# Patient Record
Sex: FEMALE | Race: WHITE | NOT HISPANIC OR LATINO | URBAN - METROPOLITAN AREA
[De-identification: names, ages, dates, MRNs, and addresses within clinical notes are randomized per-mention and may not be internally consistent; named-entity substitution may affect disease eponyms.]

---

## 2017-05-10 ENCOUNTER — OFFICE VISIT - HEALTHEAST (OUTPATIENT)
Dept: FAMILY MEDICINE | Facility: CLINIC | Age: 57
End: 2017-05-10

## 2017-05-10 ENCOUNTER — COMMUNICATION - HEALTHEAST (OUTPATIENT)
Dept: SCHEDULING | Facility: CLINIC | Age: 57
End: 2017-05-10

## 2017-05-10 DIAGNOSIS — J18.9 RLL PNEUMONIA: ICD-10-CM

## 2017-05-10 DIAGNOSIS — R05.9 COUGH: ICD-10-CM

## 2017-11-14 ENCOUNTER — OFFICE VISIT - HEALTHEAST (OUTPATIENT)
Dept: INTERNAL MEDICINE | Facility: CLINIC | Age: 57
End: 2017-11-14

## 2017-11-14 DIAGNOSIS — K64.8 INTERNAL HEMORRHOIDS: ICD-10-CM

## 2017-11-14 DIAGNOSIS — Z00.00 ANNUAL PHYSICAL EXAM: ICD-10-CM

## 2017-11-14 DIAGNOSIS — E55.9 VITAMIN D DEFICIENCY: ICD-10-CM

## 2017-11-14 DIAGNOSIS — Z13.220 SCREENING FOR LIPID DISORDERS: ICD-10-CM

## 2017-11-14 DIAGNOSIS — Z12.31 VISIT FOR SCREENING MAMMOGRAM: ICD-10-CM

## 2017-11-14 DIAGNOSIS — Z01.89 ENCOUNTER FOR ROUTINE LABORATORY TESTING: ICD-10-CM

## 2017-11-14 DIAGNOSIS — K21.9 GERD (GASTROESOPHAGEAL REFLUX DISEASE): ICD-10-CM

## 2017-11-14 ASSESSMENT — MIFFLIN-ST. JEOR: SCORE: 1274.61

## 2017-11-15 ENCOUNTER — AMBULATORY - HEALTHEAST (OUTPATIENT)
Dept: LAB | Facility: CLINIC | Age: 57
End: 2017-11-15

## 2017-11-15 ENCOUNTER — AMBULATORY - HEALTHEAST (OUTPATIENT)
Dept: INTERNAL MEDICINE | Facility: CLINIC | Age: 57
End: 2017-11-15

## 2017-11-15 ENCOUNTER — COMMUNICATION - HEALTHEAST (OUTPATIENT)
Dept: INTERNAL MEDICINE | Facility: CLINIC | Age: 57
End: 2017-11-15

## 2017-11-15 DIAGNOSIS — K21.9 GERD (GASTROESOPHAGEAL REFLUX DISEASE): ICD-10-CM

## 2017-11-15 DIAGNOSIS — Z01.89 ENCOUNTER FOR ROUTINE LABORATORY TESTING: ICD-10-CM

## 2017-11-15 DIAGNOSIS — Z13.220 SCREENING FOR LIPID DISORDERS: ICD-10-CM

## 2017-11-15 DIAGNOSIS — R10.12 LEFT UPPER QUADRANT PAIN: ICD-10-CM

## 2017-11-15 DIAGNOSIS — E55.9 VITAMIN D DEFICIENCY: ICD-10-CM

## 2017-11-15 LAB
CHOLEST SERPL-MCNC: 235 MG/DL
FASTING STATUS PATIENT QL REPORTED: YES
HDLC SERPL-MCNC: 50 MG/DL
LDLC SERPL CALC-MCNC: 163 MG/DL
TRIGL SERPL-MCNC: 111 MG/DL

## 2017-12-08 ENCOUNTER — HOSPITAL ENCOUNTER (OUTPATIENT)
Dept: MAMMOGRAPHY | Facility: HOSPITAL | Age: 57
Discharge: HOME | End: 2017-12-08
Attending: FAMILY MEDICINE

## 2017-12-08 DIAGNOSIS — Z12.31 VISIT FOR SCREENING MAMMOGRAM: ICD-10-CM

## 2017-12-11 ENCOUNTER — OFFICE VISIT - HEALTHEAST (OUTPATIENT)
Dept: FAMILY MEDICINE | Facility: CLINIC | Age: 57
End: 2017-12-11

## 2017-12-11 DIAGNOSIS — R10.32 LEFT LOWER QUADRANT PAIN: ICD-10-CM

## 2018-01-05 ENCOUNTER — RECORDS - HEALTHEAST (OUTPATIENT)
Dept: ADMINISTRATIVE | Facility: OTHER | Age: 58
End: 2018-01-05

## 2018-04-09 ENCOUNTER — RECORDS - HEALTHEAST (OUTPATIENT)
Dept: ADMINISTRATIVE | Facility: OTHER | Age: 58
End: 2018-04-09

## 2018-05-01 ENCOUNTER — RECORDS - HEALTHEAST (OUTPATIENT)
Dept: ADMINISTRATIVE | Facility: OTHER | Age: 58
End: 2018-05-01

## 2018-05-02 ENCOUNTER — RECORDS - HEALTHEAST (OUTPATIENT)
Dept: ADMINISTRATIVE | Facility: OTHER | Age: 58
End: 2018-05-02

## 2018-05-14 ASSESSMENT — MIFFLIN-ST. JEOR: SCORE: 1291.17

## 2018-05-16 ENCOUNTER — SURGERY - HEALTHEAST (OUTPATIENT)
Dept: SURGERY | Facility: CLINIC | Age: 58
End: 2018-05-16

## 2018-05-16 ENCOUNTER — ANESTHESIA - HEALTHEAST (OUTPATIENT)
Dept: SURGERY | Facility: CLINIC | Age: 58
End: 2018-05-16

## 2018-05-30 ENCOUNTER — OFFICE VISIT - HEALTHEAST (OUTPATIENT)
Dept: FAMILY MEDICINE | Facility: CLINIC | Age: 58
End: 2018-05-30

## 2018-05-30 DIAGNOSIS — G89.18 ACUTE POST-OPERATIVE PAIN: ICD-10-CM

## 2018-10-01 ENCOUNTER — OFFICE VISIT - HEALTHEAST (OUTPATIENT)
Dept: FAMILY MEDICINE | Facility: CLINIC | Age: 58
End: 2018-10-01

## 2018-10-01 DIAGNOSIS — Z23 NEEDS FLU SHOT: ICD-10-CM

## 2018-10-01 DIAGNOSIS — M70.62 GREATER TROCHANTERIC BURSITIS OF LEFT HIP: ICD-10-CM

## 2018-10-17 ENCOUNTER — COMMUNICATION - HEALTHEAST (OUTPATIENT)
Dept: FAMILY MEDICINE | Facility: CLINIC | Age: 58
End: 2018-10-17

## 2019-01-17 ENCOUNTER — OFFICE VISIT - HEALTHEAST (OUTPATIENT)
Dept: FAMILY MEDICINE | Facility: CLINIC | Age: 59
End: 2019-01-17

## 2019-01-17 DIAGNOSIS — B02.9 HERPES ZOSTER WITHOUT COMPLICATION: ICD-10-CM

## 2019-01-17 DIAGNOSIS — Z71.6 ENCOUNTER FOR TOBACCO USE CESSATION COUNSELING: ICD-10-CM

## 2019-02-21 ENCOUNTER — COMMUNICATION - HEALTHEAST (OUTPATIENT)
Dept: FAMILY MEDICINE | Facility: CLINIC | Age: 59
End: 2019-02-21

## 2019-02-21 DIAGNOSIS — F17.200 SMOKING: ICD-10-CM

## 2019-02-26 ENCOUNTER — COMMUNICATION - HEALTHEAST (OUTPATIENT)
Dept: FAMILY MEDICINE | Facility: CLINIC | Age: 59
End: 2019-02-26

## 2019-07-12 ENCOUNTER — OFFICE VISIT - HEALTHEAST (OUTPATIENT)
Dept: FAMILY MEDICINE | Facility: CLINIC | Age: 59
End: 2019-07-12

## 2019-07-12 DIAGNOSIS — Z00.00 ROUTINE GENERAL MEDICAL EXAMINATION AT A HEALTH CARE FACILITY: ICD-10-CM

## 2019-07-12 DIAGNOSIS — N63.0 BREAST MASS: ICD-10-CM

## 2019-07-12 DIAGNOSIS — F17.200 SMOKING: ICD-10-CM

## 2019-07-12 LAB
ALBUMIN SERPL-MCNC: 3.9 G/DL (ref 3.5–5)
ALP SERPL-CCNC: 68 U/L (ref 45–120)
ALT SERPL W P-5'-P-CCNC: 16 U/L (ref 0–45)
ANION GAP SERPL CALCULATED.3IONS-SCNC: 8 MMOL/L (ref 5–18)
AST SERPL W P-5'-P-CCNC: 17 U/L (ref 0–40)
BILIRUB SERPL-MCNC: 0.3 MG/DL (ref 0–1)
BUN SERPL-MCNC: 9 MG/DL (ref 8–22)
CALCIUM SERPL-MCNC: 9.9 MG/DL (ref 8.5–10.5)
CHLORIDE BLD-SCNC: 104 MMOL/L (ref 98–107)
CHOLEST SERPL-MCNC: 234 MG/DL
CO2 SERPL-SCNC: 28 MMOL/L (ref 22–31)
CREAT SERPL-MCNC: 0.76 MG/DL (ref 0.6–1.1)
ERYTHROCYTE [DISTWIDTH] IN BLOOD BY AUTOMATED COUNT: 10.9 % (ref 11–14.5)
FASTING STATUS PATIENT QL REPORTED: NO
GFR SERPL CREATININE-BSD FRML MDRD: >60 ML/MIN/1.73M2
GLUCOSE BLD-MCNC: 85 MG/DL (ref 70–125)
HCT VFR BLD AUTO: 46.5 % (ref 35–47)
HDLC SERPL-MCNC: 48 MG/DL
HGB BLD-MCNC: 15.7 G/DL (ref 12–16)
LDLC SERPL CALC-MCNC: 143 MG/DL
MCH RBC QN AUTO: 30.9 PG (ref 27–34)
MCHC RBC AUTO-ENTMCNC: 33.8 G/DL (ref 32–36)
MCV RBC AUTO: 91 FL (ref 80–100)
PLATELET # BLD AUTO: 225 THOU/UL (ref 140–440)
PMV BLD AUTO: 9 FL (ref 7–10)
POTASSIUM BLD-SCNC: 4.5 MMOL/L (ref 3.5–5)
PROT SERPL-MCNC: 6.8 G/DL (ref 6–8)
RBC # BLD AUTO: 5.09 MILL/UL (ref 3.8–5.4)
SODIUM SERPL-SCNC: 140 MMOL/L (ref 136–145)
TRIGL SERPL-MCNC: 213 MG/DL
TSH SERPL DL<=0.005 MIU/L-ACNC: 0.51 UIU/ML (ref 0.3–5)
WBC: 6.1 THOU/UL (ref 4–11)

## 2019-07-12 ASSESSMENT — MIFFLIN-ST. JEOR: SCORE: 1146.59

## 2019-07-17 ENCOUNTER — HOSPITAL ENCOUNTER (OUTPATIENT)
Dept: MAMMOGRAPHY | Facility: CLINIC | Age: 59
Discharge: HOME | End: 2019-07-17
Attending: NURSE PRACTITIONER

## 2019-07-17 DIAGNOSIS — N63.0 BREAST MASS: ICD-10-CM

## 2020-05-26 ENCOUNTER — COMMUNICATION - HEALTHEAST (OUTPATIENT)
Dept: FAMILY MEDICINE | Facility: CLINIC | Age: 60
End: 2020-05-26

## 2020-05-26 DIAGNOSIS — F17.200 SMOKING: ICD-10-CM

## 2020-10-14 ENCOUNTER — OFFICE VISIT - HEALTHEAST (OUTPATIENT)
Dept: FAMILY MEDICINE | Facility: CLINIC | Age: 60
End: 2020-10-14

## 2020-10-14 ENCOUNTER — COMMUNICATION - HEALTHEAST (OUTPATIENT)
Dept: FAMILY MEDICINE | Facility: CLINIC | Age: 60
End: 2020-10-14

## 2020-10-14 DIAGNOSIS — F41.1 GENERALIZED ANXIETY DISORDER: ICD-10-CM

## 2020-10-14 LAB
ANION GAP SERPL CALCULATED.3IONS-SCNC: 9 MMOL/L (ref 5–18)
BUN SERPL-MCNC: 8 MG/DL (ref 8–22)
CALCIUM SERPL-MCNC: 9.7 MG/DL (ref 8.5–10.5)
CHLORIDE BLD-SCNC: 105 MMOL/L (ref 98–107)
CO2 SERPL-SCNC: 26 MMOL/L (ref 22–31)
CREAT SERPL-MCNC: 0.7 MG/DL (ref 0.6–1.1)
GFR SERPL CREATININE-BSD FRML MDRD: >60 ML/MIN/1.73M2
GLUCOSE BLD-MCNC: 95 MG/DL (ref 70–125)
POTASSIUM BLD-SCNC: 4.5 MMOL/L (ref 3.5–5)
SODIUM SERPL-SCNC: 140 MMOL/L (ref 136–145)
TSH SERPL DL<=0.005 MIU/L-ACNC: 0.58 UIU/ML (ref 0.3–5)

## 2020-10-14 ASSESSMENT — MIFFLIN-ST. JEOR: SCORE: 1117.9

## 2020-10-15 ENCOUNTER — COMMUNICATION - HEALTHEAST (OUTPATIENT)
Dept: FAMILY MEDICINE | Facility: CLINIC | Age: 60
End: 2020-10-15

## 2020-10-16 ASSESSMENT — ANXIETY QUESTIONNAIRES
7. FEELING AFRAID AS IF SOMETHING AWFUL MIGHT HAPPEN: MORE THAN HALF THE DAYS
2. NOT BEING ABLE TO STOP OR CONTROL WORRYING: NEARLY EVERY DAY
GAD7 TOTAL SCORE: 15
6. BECOMING EASILY ANNOYED OR IRRITABLE: MORE THAN HALF THE DAYS
3. WORRYING TOO MUCH ABOUT DIFFERENT THINGS: NEARLY EVERY DAY
5. BEING SO RESTLESS THAT IT IS HARD TO SIT STILL: NOT AT ALL
1. FEELING NERVOUS, ANXIOUS, OR ON EDGE: NEARLY EVERY DAY
4. TROUBLE RELAXING: MORE THAN HALF THE DAYS

## 2021-05-25 ENCOUNTER — RECORDS - HEALTHEAST (OUTPATIENT)
Dept: ADMINISTRATIVE | Facility: CLINIC | Age: 61
End: 2021-05-25

## 2021-05-26 ENCOUNTER — RECORDS - HEALTHEAST (OUTPATIENT)
Dept: ADMINISTRATIVE | Facility: CLINIC | Age: 61
End: 2021-05-26

## 2021-05-27 ENCOUNTER — RECORDS - HEALTHEAST (OUTPATIENT)
Dept: ADMINISTRATIVE | Facility: CLINIC | Age: 61
End: 2021-05-27

## 2021-05-28 ENCOUNTER — RECORDS - HEALTHEAST (OUTPATIENT)
Dept: ADMINISTRATIVE | Facility: CLINIC | Age: 61
End: 2021-05-28

## 2021-05-28 ASSESSMENT — ANXIETY QUESTIONNAIRES: GAD7 TOTAL SCORE: 15

## 2021-05-29 ENCOUNTER — RECORDS - HEALTHEAST (OUTPATIENT)
Dept: ADMINISTRATIVE | Facility: CLINIC | Age: 61
End: 2021-05-29

## 2021-05-30 NOTE — PROGRESS NOTES
ASSESSMENT & PLAN:  1. Smoking  Would like to proceed with smoking cessation again as she has recently restarted smoking due to stress.  Discussed options and will prescribe Chantix, let us know if side effects.  - varenicline (CHANTIX STARTING MONTH BOX) 0.5 mg (11)- 1 mg (42) tablet; TAKE 0.5MG TABLET DAILY FOR 3 DAYS, THEN INCREASE TO 0.5MG TABLET TWICE DAILY FOR 3 DAYS, THEN INCREASE TO 1MG TABLET TWICE DAILY  Dispense: 53 tablet; Refill: 0  - varenicline (CHANTIX) 1 mg tablet; Take 1 tablet (1 mg total) by mouth 2 (two) times a day.  Dispense: 60 tablet; Refill: 1    2. Routine general medical examination at a health care facility  Update fasting lab work, follow-up for results.  Otherwise healthy female exam is up-to-date on screenings.  - Lipid Cascade RANDOM  - HM2(CBC w/o Differential)  - Thyroid [place]  - Comprehensive Metabolic Panel    3. Breast mass  Given multiple masses and lymph node swelling proceed with diagnostic mammogram and ultrasound.  Follow-up for results.  - Mammo Diagnostic Bilateral; Future  - US Breast Limited (Focal) Bilateral; Future      Patient Instructions   US scheduling is [phone]    We'll get back with labs when available.          Orders Placed This Encounter   Procedures     Mammo Diagnostic Bilateral     Standing Status:   Future     Standing Expiration Date:   7/12/2020     Order Specific Question:   Patient's previous breast density:     Answer:   Heterogeneously dense [3]     Order Specific Question:   Is the patient pregnant?     Answer:   No     Order Specific Question:   Can the procedure be changed per Radiologist protocol?     Answer:   Yes     US Breast Limited (Focal) Bilateral     Standing Status:   Future     Standing Expiration Date:   7/12/2020     Order Specific Question:   Is the patient pregnant?     Answer:   No     Order Specific Question:   Can the procedure be changed per Radiologist protocol?     Answer:   Yes     Varicella Zoster, Recombinant  Vaccine IM     Lipid Cascade RANDOM     Order Specific Question:   Fasting is required?     Answer:   No     HM2(CBC w/o Differential)     Thyroid [place]     Comprehensive Metabolic Panel     Medications Discontinued During This Encounter   Medication Reason     varenicline (CHANTIX STARTING MONTH BOX) 0.5 mg (11)- 1 mg (42) tablet Reorder           General  Immunizations reviewed and updated .  Alcohol use, safety and moderation discussed.  Recommended adequate calcium intake/osteoporosis prevention.  Discussed colon cancer screening at age 50, 45 if -American.  Diet and exercise reviewed, including goal of aerobic exercise 30-90 minutes most days of the week, moderation of portions sizes, avoiding eating out and fast food and increase in fruits and vegetables.  Discussed safe sex practices.  Discussed & recommended seat belt (& motorcycle helmet) use.  Discussed & recommended smoke detector.  Discussed sun protection.  Discussed weight management.    The following high BMI interventions were performed this visit: encouragement to exercise    CHIEF COMPLAINT:  Chief Complaint   Patient presents with     Annual Exam     pt is not fasting, bilateral lump in armpits         HISTORY OF PRESENT ILLNESS:  Yu is a 58 y.o. female presenting to the clinic today for a physical examination.  Patient is feeling well would like to discuss multiple breast masses she has found over the last 2 months.  There is one mass in the lower right breast and one mass in the left upper breast she would like evaluated today.  Previous mammogram done about a year and half ago was normal.  She is also noticed some lymph node swelling in both armpits.  She is concerned as there is a very strong family history of cancer and like to get these evaluated.  Multiple female relatives with breast cancer.  Patient otherwise doing well would like to discuss smoking cessation and has quit successfully with Chantix before and would like to  proceed with that again.    REVIEW OF SYSTEMS:   All other systems are negative.    PFSH:  Social History:  The patient reports that there is not domestic violence in her life.     Regular Exercise: yes  Sunscreen Use: yes  Healthy Diet: yes  Dental Visits Regularly: yes  Sexually active: no  Colonoscopy: yes  Prevention of Osteoporosis: no   Last DXA: no    Social History     Socioeconomic History     Marital status:      Spouse name: Not on file     Number of children: Not on file     Years of education: Not on file     Highest education level: Not on file   Occupational History     Not on file   Social Needs     Financial resource strain: Not on file     Food insecurity:     Worry: Not on file     Inability: Not on file     Transportation needs:     Medical: Not on file     Non-medical: Not on file   Tobacco Use     Smoking status: Former Smoker     Packs/day: 0.50     Last attempt to quit: 10/16/2010     Years since quittin.7     Smokeless tobacco: Never Used     Tobacco comment: smoked for 20 years   Substance and Sexual Activity     Alcohol use: Yes     Comment: occassinally every 3 months     Drug use: No     Sexual activity: Never   Lifestyle     Physical activity:     Days per week: Not on file     Minutes per session: Not on file     Stress: Not on file   Relationships     Social connections:     Talks on phone: Not on file     Gets together: Not on file     Attends Restoration service: Not on file     Active member of club or organization: Not on file     Attends meetings of clubs or organizations: Not on file     Relationship status: Not on file     Intimate partner violence:     Fear of current or ex partner: Not on file     Emotionally abused: Not on file     Physically abused: Not on file     Forced sexual activity: Not on file   Other Topics Concern     Not on file   Social History Narrative     Not on file       Social History     Tobacco Use   Smoking Status Former Smoker     Packs/day:  0.50     Last attempt to quit: 10/16/2010     Years since quittin.7   Smokeless Tobacco Never Used   Tobacco Comment    smoked for 20 years       Family History:  Family History   Problem Relation Age of Onset     Drug abuse Father      Cancer Father         throat     Hypertension Sister      Depression Sister      Cancer Sister      Endometrial cancer Sister 46     Breast cancer Sister         in her late 40 s     Cancer Brother         throat     Breast cancer Mother 56     Stroke Mother         ischemic     Diabetes Mother      Cancer Mother      No Medical Problems Daughter      No Medical Problems Maternal Grandmother      No Medical Problems Maternal Grandfather      No Medical Problems Paternal Grandmother      No Medical Problems Paternal Grandfather      No Medical Problems Maternal Aunt      No Medical Problems Paternal Aunt      Stroke Sister         hemorrhagic     Breast cancer Sister         in her 30 s     BRCA / Neg Hx      Colon cancer Neg Hx      Ovarian cancer Neg Hx        Past Medical History:  Active Ambulatory Problems     Diagnosis Date Noted     Joint Stiffness Of The Knee      Abdominal pain      Arthralgias In Multiple Sites      Abnormal Weight Gain      Postherpetic Neuralgia      Acrochordon      Plantar Fasciitis      Esophageal reflux      Colonic And Rectal Disorders      Vitamin D Deficiency      Colon polyp 2018     Acute post-operative pain 2018     Non-intractable vomiting with nausea, unspecified vomiting type      Resolved Ambulatory Problems     Diagnosis Date Noted     Lateral epicondylitis      Joint Pain, Localized In The Elbow      Medial Epicondylitis      H. pylori infection 2016     Past Medical History:   Diagnosis Date     Fibrocystic breast      Hemorrhoids        Allergies   Allergen Reactions     Bupropion Hcl      throat swelling       Tramadol        Immunization History   Administered Date(s) Administered     DT (pediatric) 2004  "    Hep A, historic 2008, 2009     Influenza, seasonal,quad inj 36+ mos 2016, 10/01/2018     Influenza, seasonal,quad inj 6-35 mos 01/10/2013, 10/09/2014     Influenza,seasonal quad, PF 10/25/2013     Influenza,seasonal quad, PF, 36+MOS 2017     Td,adult,historic,unspecified 2004     Tdap 2011     ZOSTER, RECOMBINANT, IM 2019, 2019     Immunization status: up to date and documented    Gynecologic History:  No LMP recorded. Patient has had a hysterectomy.  Contraception:barrier methods  Last Pap: Not applicable.  Last mammogram: . Results were: normal    OB History:  OB History        3    Para   3    Term   3            AB        Living           SAB        TAB        Ectopic        Multiple        Live Births                     Surgical History:  Past Surgical History:   Procedure Laterality Date     APPENDECTOMY       BREAST BIOPSY Right     benign     CHOLECYSTECTOMY       HYSTERECTOMY      benign     LAPAROSCOPIC CHOLECYSTECTOMY N/A 2018    Procedure: LAPAROSCOPIC CHOLECYSTECTOMY;  Surgeon: Nicolas Carver MD;  Location: [facility];  Service:      OOPHORECTOMY       TN APPENDECTOMY      Description: Appendectomy;  Recorded: 2008;     TN BIOPSY OF BREAST, INCISIONAL      Description: Biopsy Breast Open;  Recorded: 2008;  Comments: benign--fi brocystic changes     TN  DELIVERY ONLY      Description:  Section;  Recorded: 2008;     TN DILATION/CURETTAGE,DIAGNOSTIC      Description: Dilation And Curettage;  Recorded: 2008;  Comments: for miscarriage     TN VAGINAL HYSTERECTOMY,UTERUS 250 GMS/<      Description: Vaginal Hysterectomy;  Recorded: 2008;  Comments: single oophorectomy       VITALS:  Vitals:    19 1041   BP: 122/77   Patient Site: Left Arm   Patient Position: Sitting   Cuff Size: Adult Regular   Pulse: 79   Resp: 16   Weight: 136 lb 2 oz (61.7 kg)   Height: 5' 3\" " (1.6 m)     Wt Readings from Last 3 Encounters:   07/12/19 136 lb 2 oz (61.7 kg)   01/17/19 141 lb 6.4 oz (64.1 kg)   10/01/18 150 lb 2 oz (68.1 kg)       PHYSICAL EXAM:  General Appearance: Reveals an alert, cooperative 58-year-old female.   Vitals:  Per nursing notes.  Head: Normocephalic, without obvious abnormality, atraumatic   Eyes: PERRL, conjunctiva/corneas clear, EOM's intact   Ears: Normal TM's and external ear canals, both ears   Oropharynx: Nares normal, septum midline, mucosa normal, no drainage, lips, and tongue normal   Neck: Supple, symmetrical, trachea midline, no adenopathy; thyroid: not enlarged, symmetric, no tenderness/mass/nodules   Back: Symmetric, no curvature, ROM normal, no CVA tenderness   Lungs: Clear to auscultation bilaterally, respirations unlabored, no wheezing or rales   Heart: Regular rate and rhythm, S1 and S2 normal, no murmur, rub, or gallop, no carotid bruits  Breasts: Breast mass in the right lower outer quadrant approximately an inch to 2 inches below the area [place], feels firm but mobile.  Overall breast tissue is fairly fibrous.  Lump in the left upper outer quadrant very small approximate less than 1 cm in diameter, firm and mobile.  Bilateral lymph node swelling in the axilla.  Lymph nodes are tender to palpation.  Abdomen: Soft, non-tender, no masses, no organomegaly,  Pelvic: Normal-appearing female genitalia. No adnexal masses   Extremities: Extremities normal, atraumatic, no cyanosis or edema   Skin: Skin color, texture, turgor normal, no rashes or lesions   Lymph nodes: Cervical, supraclavicular, and axillary nodes normal.  Neurologic: Normal   Psych: Normal affect. Does not appear anxious or depressed.     DATA REVIEWED:  Additional History from Old Records or Another Person Summarized (2 total): None.     Decision to Obtain Extra information (1 total): None.     Radiology Tests Summarized and Ordered (XRAY/CT/MRI/DXA) (1 total): None.    Labs Reviewed and Ordered (1  total): None.    Medicine Tests Summarized and Ordered (EKG/ECHO/COLONOSCOPY/EGD) (1 total): None.    Independent Review of EKG or X-Ray (2 each): None.    The visit lasted a total of 40 minutes face to face with the patient. Over 50% of the time was spent conducting the physical and in coordination of care.      MEDICATIONS:  Current Outpatient Medications   Medication Sig Dispense Refill     psyllium (METAMUCIL) 0.52 gram capsule Take 0.52 g by mouth daily.       varenicline (CHANTIX STARTING MONTH BOX) 0.5 mg (11)- 1 mg (42) tablet TAKE 0.5MG TABLET DAILY FOR 3 DAYS, THEN INCREASE TO 0.5MG TABLET TWICE DAILY FOR 3 DAYS, THEN INCREASE TO 1MG TABLET TWICE DAILY 53 tablet 0     varenicline (CHANTIX) 1 mg tablet Take 1 tablet (1 mg total) by mouth 2 (two) times a day. 60 tablet 1     No current facility-administered medications for this visit.        Total Data Points:     Zaida Graf CNP    This note has been dictated using voice recognition software. Any grammatical or context distortions are unintentional and inherent to the software

## 2021-05-31 VITALS — WEIGHT: 161.8 LBS | BODY MASS INDEX: 28.21 KG/M2

## 2021-05-31 VITALS — WEIGHT: 169 LBS | BODY MASS INDEX: 29.47 KG/M2

## 2021-05-31 VITALS — BODY MASS INDEX: 27.76 KG/M2 | WEIGHT: 162.6 LBS | HEIGHT: 64 IN

## 2021-06-01 VITALS — WEIGHT: 160.19 LBS | BODY MASS INDEX: 28.38 KG/M2

## 2021-06-01 VITALS — HEIGHT: 63 IN | BODY MASS INDEX: 29.77 KG/M2 | WEIGHT: 168 LBS

## 2021-06-02 ENCOUNTER — RECORDS - HEALTHEAST (OUTPATIENT)
Dept: ADMINISTRATIVE | Facility: CLINIC | Age: 61
End: 2021-06-02

## 2021-06-02 VITALS — WEIGHT: 150.13 LBS | BODY MASS INDEX: 26.59 KG/M2

## 2021-06-02 VITALS — WEIGHT: 141.4 LBS | BODY MASS INDEX: 25.05 KG/M2

## 2021-06-03 VITALS — WEIGHT: 136.13 LBS | BODY MASS INDEX: 24.12 KG/M2 | HEIGHT: 63 IN

## 2021-06-05 VITALS
HEIGHT: 63 IN | SYSTOLIC BLOOD PRESSURE: 126 MMHG | DIASTOLIC BLOOD PRESSURE: 78 MMHG | WEIGHT: 129.8 LBS | HEART RATE: 76 BPM | BODY MASS INDEX: 23 KG/M2

## 2021-06-08 NOTE — TELEPHONE ENCOUNTER
Former patient of Graf & has not established care with another provider.  Please assign refill request to covering provider per Clinic standard process.  Refill Approved    Rx renewed per Medication Renewal Policy. Medication was last renewed on 7/12/19.    Janice Tejeda, [facility] Triage/Med Refill 5/29/2020     Requested Prescriptions   Pending Prescriptions Disp Refills     CHANTIX 1 mg tablet [Pharmacy Med Name: Chantix 1 MG Oral Tablet] 60 tablet 0     Sig: Take 1 tablet by mouth twice daily       Varenicline Refill Protocol Passed - 5/26/2020  1:20 PM        Passed - Normal Serum Creatinine in past 12 months      Creatinine   Date Value Ref Range Status   07/12/2019 0.76 0.60 - 1.10 mg/dL Final             Passed - PCP or prescribing provider visit in last 12 or next 3 months.     Last office visit with prescriber/PCP: Visit date not found OR same dept: Visit date not found  Last physical: 7/12/2019       Next visit within 3 mo: Visit date not found  Next physical within 3 mo: Visit date not found  Prescriber OR PCP: LESLI Dupree  Last diagnosis associated with med order: 1. Smoking  - CHANTIX 1 mg tablet [Pharmacy Med Name: Chantix 1 MG Oral Tablet]; Take 1 tablet by mouth twice daily  Dispense: 60 tablet; Refill: 0     Requested Prescriptions     Pending Prescriptions Disp Refills     CHANTIX 1 mg tablet [Pharmacy Med Name: Chantix 1 MG Oral Tablet] 60 tablet 0     Sig: Take 1 tablet by mouth twice daily     May refill for 3 months if protocol passes.

## 2021-06-12 NOTE — PROGRESS NOTES
"  Assessment/Plan:      Generalized anxiety disorder  Patient's history is most consistent with MEE, exacerbated by coronavirus and life changes related to this.  It sounds like she has always had an element of anxiety.  She is resistant to trying a medicine for her symptoms, so would like her to start with CBT.  Referral placed today.  Labs drawn as below to attempt to exclude an organic cause of her exacerbated anxiety, these are normal.  - AMB REFERRAL TO MENTAL HEALTH AND ADDICTION  - Adult (18+); Outpatient Treatment; Individual/Couples/Family/Group Therapy/Health Psychology; [facility]; [facility]; We will contact you to schedule the appointment or p...  - Basic Metabolic Panel  - Thyroid Cascade        Subjective:       Yu Rocha is a 60 y.o. female who presents for a discussion of anxiety.  Patient relates that she lives with her , 2 sons and 2 grandsons.  She says that 3 adults work outside the home, and her grandsons are hybrid learning.  She says that she can \"never do enough\" and she feels that she cannot adequately keep the house clean.  She relates that she does laundry every day, making the kids take off clothes once they get home, and sanitizes counter tops and touched surfaces at least twice daily.  Her  is high risk for complications related to COVID with his age and diabetes.  She says that she has always had an element of anxiety, but now is not sleeping well and is significantly bothered by her symptoms.  These include feeling anxious, excessive worry, trouble relaxing, irritability and feeling of doom.  She was summoned for jury duty recently and says that this greatly exacerbated her anxiety as she doesn't drive herself and the prospect of needing to do this overwhelmed her.  She has no other questions or concerns today.    Labs Reviewed:  Lab Results   Component Value Date    WBC 6.1 07/12/2019    HGB 15.7 07/12/2019    HCT 46.5 07/12/2019    PLT " "225 07/12/2019    CHOL 234 (H) 07/12/2019    TRIG 213 (H) 07/12/2019    HDL 48 (L) 07/12/2019    ALT 16 07/12/2019    AST 17 07/12/2019    INR 1.02 05/21/2018       The following portions of the patient's history were reviewed and updated as appropriate: allergies, current medications, past family history, past medical history, past social history, past surgical history and problem list.      Current Outpatient Medications:      CHANTIX 1 mg tablet, Take 1 tablet by mouth twice daily, Disp: 60 tablet, Rfl: 0     psyllium (METAMUCIL) 0.52 gram capsule, Take 0.52 g by mouth daily., Disp: , Rfl:      varenicline (CHANTIX STARTING MONTH BOX) 0.5 mg (11)- 1 mg (42) tablet, TAKE 0.5MG TABLET DAILY FOR 3 DAYS, THEN INCREASE TO 0.5MG TABLET TWICE DAILY FOR 3 DAYS, THEN INCREASE TO 1MG TABLET TWICE DAILY, Disp: 53 tablet, Rfl: 0    Review of Systems   Pertinent items are noted in HPI.      Objective:      /78 (Patient Site: Left Arm, Patient Position: Sitting, Cuff Size: Adult Regular)   Pulse 76   Ht 5' 3\" (1.6 m)   Wt 129 lb 12.8 oz (58.9 kg)   Breastfeeding No   BMI 22.99 kg/m      General appearance: alert, appears stated age and cooperative  Head: Normocephalic, without obvious abnormality, atraumatic  Eyes: conjunctivae clear, sclerae anicteric  Neck: no adenopathy, supple, symmetrical, trachea midline and thyroid not enlarged, symmetric, no tenderness/mass/nodules  Lungs: clear to auscultation bilaterally  Heart: regular rate and rhythm, S1, S2 normal, no murmur, click, rub or gallop  Neurologic: Grossly normal  Psychiatric: speech is fluent and thought process is linear; mood is anxious, affect congruent          "

## 2021-06-12 NOTE — TELEPHONE ENCOUNTER
----- Message from Tamie Mcintosh sent at 10/15/2020  5:04 PM CDT -----    Yu Rocha, declined scheduling services.  Our times did not fit patient schedule. Advised her to check with her insurance for more options.     If they do call to schedule, in the future, we will inform you of the outcome.    Thank you for your referral,  [facility]

## 2021-06-14 NOTE — PROGRESS NOTES
Assessment/Plan:        Diagnoses and all orders for this visit:    Left lower quadrant pain  -     Lipase  -     Ambulatory referral to Gastroenterology   Consider minimizing amount of processed foods like pasta.    Follow up in clinic if no answers from [facility].         Subjective:    Patient ID: Yu Rocha is a 57 y.o. female.    HPI patient comes in with history of abdominal pain for the past 2-3 months.  She was seen for her annual physical in November.  A CT scan was done at that time and found a small hiatal hernia but other everything else was normal.  Patient states that her symptoms have been alternating between loose bloody stool, left mid abdominal pain, epigastric pain, thin strand-like stool, blood in stool, pain with defecation.  Patient has had problems with GERD in the past, took omeprazole for a while stopped after 90 days.  In the last 2 weeks started up again because of her epigastric pain.  States that she has changed her lifestyle including reading to reduce coffee intake.  Patient states that she thinks that she has a healthy diet, eats real [employer] primarily cooks and uses her own luzma in her food.  Has seen gastroenterology several years ago her bowel patterns; but has not seen them since and now she has had developed pain.  In mid November labs drawn were CBC and CMP all were normal.  Electrolytes, liver, kidneys were all normal.    The following portions of the patient's history were reviewed and updated as appropriate: allergies, current medications, past family history, past medical history, past social history, past surgical history and problem list.    Review of Systems   Gastrointestinal: Positive for abdominal pain, blood in stool, constipation, diarrhea and rectal pain.   Genitourinary: Negative.    All other systems reviewed and are negative.            Objective:    Physical Exam        General: Patient appears to be in no acute distress.  Lungs: Unlabored. clear breath  sounds heard throughout lung fields.   Heart: Regular rate and rhythm.  Abdomen: Soft rounded abdomen. Bowel sounds heard in all four quadrants; liver, spleen, and kidney not palpable,  tenderness on palpation of mid left quadrant and epigastric area, no CVA tenderness.

## 2021-06-14 NOTE — PROGRESS NOTES
Assessment/Plan:     1. Annual physical exam    She is a pleasant 57 year-old female here for an annual physical examination, routine lab testing, and health maintenance updates.  She is doing well with the exception of a few of the issues noted below.  She is due for her annual mammogram screening for breast cancer.  Today, she feels good, her vital signs are stable, and her physical exam was unremarkable, except as noted below.  She has no other immediate concerns that she would like addressed at this visit.    2. Encounter for routine laboratory testing  - Comprehensive Metabolic Panel; Future  - HM2(CBC w/o Differential); Future    3. Screening for lipid disorders  - Lipid [place] FASTING; Future    4. Vitamin D deficiency  - Vitamin D, Total (25-Hydroxy); Future    5. Internal hemorrhoids  - hydrocortisone 25 mg suppository; Insert 1 suppository (25 mg total) into the rectum 2 (two) times a day as needed for hemorrhoids.  Dispense: 12 suppository; Refill: 1    6. Visit for screening mammogram  - Mammo Screening Bilateral    7. GERD (gastroesophageal reflux disease)    Hx of H. pylori infection, treated with antibiotics and PPI.  Patient discontinued the PPI per the suggestion of her sister, a pharmacist, noting not to stay on the medication too long.  Subsequent to this, she has developed increasing symptoms of mild epigastric pain/discomfort and esophageal reflux again.  - H. pylori Antigen, Stool; Future    - I have recommended the patient eat a healthy, well-balanced diet and exercise regularly.       Return in about 3 months (around 2/14/2018) for or, Follow-up with your Primary Care Provider.       Subjective:     Yu Rocha is a 57 y.o. female with a PMH pertinent for hemorrhoids, vitamin D deficiency, postherpetic neuralgia, H. pylori infection, GERD, and polyarthralgia who presents for an annual exam. She has been doing well over the last year since her last physical examination.  She notes that she  "has lost approximately 6 lbs since last year.  She complains of some GERD symptoms associated with LUQ and mid-epigastric pain/discomfort that has been going on for approximately 2 months.  She gets what feels like \"stomach acid squirting into her esophagus with bending forward or to the right side at times.  She sleeps on her left side to help prevent reflux at night.  She reports a fairly recent history of H. pylori infection and was treated with antibiotics and PPI.  She states that she recently stopped the PPI about 2 months ago as her sister, who is a pharmacist, told her not to stay on that medication longer than so many months, so she discontinued it.  She also has what sounds like internal hemorrhoids as she has noted BRB with wiping and also a bulging near her anus with increased straining to have a BM.    The patient reports that there is not domestic violence in her life.     Healthy Habits:   Regular Exercise: Yes  Sunscreen Use: No  Healthy Diet: Yes  Dental Visits Regularly: Yes  Sexually active: No  Mammogram: Yes,  Last was 2016  Colonoscopy: Yes, Last was May 2011  Prevention of Osteoporosis: Yes  Last Dexa: N/A    Immunization History   Administered Date(s) Administered     DT (pediatric) 2004     Hep A, historic 2008, 2009     Influenza, seasonal,quad inj 36+ mos 2016     Influenza, seasonal,quad inj 6-35 mos 01/10/2013, 10/09/2014     Influenza,seasonal quad, PF 10/25/2013     Influenza,seasonal quad, PF, 36+MOS 2017     Td,adult,historic,unspecified 2004     Tdap 2011     Immunization status: up to date and documented.    Gynecologic History  No LMP recorded. Patient has had a hysterectomy.  Contraception: none  Last Pap: . Results were: normal   Last mammogram: 2016. Results were: normal    OB History    Para Term  AB Living   3 3 3      SAB TAB Ectopic Multiple Live Births             # Outcome Date GA Lbr Ron/2nd Weight Sex " Delivery Anes PTL Lv   3 Term            2 Term            1 Term                   Current Outpatient Prescriptions   Medication Sig Dispense Refill     CA/D3/MAG OX/ZINC//SUNIL/BOR (CALCIUM 600+D3 PLUS ORAL) Take by mouth daily.       cholecalciferol, vitamin D3, (VITAMIN D3) 1,000 unit capsule Take 1,000 Units by mouth daily.       MAGNESIUM OXIDE/MAG AA CHELATE (MAGNESIUM, OXIDE/AA CHELATE, ORAL) Take by mouth daily.       hydrocortisone 25 mg suppository Insert 1 suppository (25 mg total) into the rectum 2 (two) times a day as needed for hemorrhoids. 12 suppository 1     No current facility-administered medications for this visit.      Patient Active Problem List   Diagnosis     Joint Stiffness Of The Knee     Abdominal Pain     Arthralgias In Multiple Sites     Abnormal Weight Gain     Postherpetic Neuralgia     Acrochordon     Plantar Fasciitis     Esophageal Reflux     Colonic And Rectal Disorders     Vitamin D Deficiency     H. pylori infection      Past Medical History:   Diagnosis Date     Fibrocystic breast      Hemorrhoids      Past Surgical History:   Procedure Laterality Date     BREAST BIOPSY Right 2005    benign     HYSTERECTOMY  1992    benign     OOPHORECTOMY       IN APPENDECTOMY      Description: Appendectomy;  Recorded: 2008;     IN BIOPSY OF BREAST, INCISIONAL      Description: Biopsy Breast Open;  Recorded: 2008;  Comments: benign--fi brocystic changes     IN  DELIVERY ONLY      Description:  Section;  Recorded: 2008;     IN DILATION/CURETTAGE,DIAGNOSTIC      Description: Dilation And Curettage;  Recorded: 2008;  Comments: for miscarriage     IN VAGINAL HYSTERECTOMY,UTERUS 250 GMS/<      Description: Vaginal Hysterectomy;  Recorded: 2008;  Comments: single oophorectomy     Allergies   Allergen Reactions     Bupropion Hcl      throat swelling       Tramadol       Family History   Problem Relation Age of Onset     Drug abuse Father      Cancer  Father      throat     Breast cancer Sister      Hypertension Sister      Depression Sister      Cancer Sister      Endometrial cancer Sister 46     Cancer Brother      throat     Breast cancer Mother      Stroke Mother      ischemic     Diabetes Mother      Cancer Mother      No Medical Problems Daughter      No Medical Problems Maternal Grandmother      No Medical Problems Maternal Grandfather      No Medical Problems Paternal Grandmother      No Medical Problems Paternal Grandfather      No Medical Problems Maternal Aunt      No Medical Problems Paternal Aunt      Stroke Sister      hemorrhagic     BRCA 1/2 Neg Hx      Colon cancer Neg Hx      Ovarian cancer Neg Hx      Social History     Social History     Marital status:      Spouse name: N/A     Number of children: N/A     Years of education: N/A     Occupational History     Not on file.     Social History Main Topics     Smoking status: Former Smoker     Smokeless tobacco: Not on file      Comment: smoked for 20 years     Alcohol use No     Drug use: Not on file     Sexual activity: Not on file     Other Topics Concern     Not on file     Social History Narrative       Review of Systems  General:  Negative except as noted above  Eyes: Negative except as noted above  Ears/Nose/Throat: Negative except as noted above  Cardiovascular: Negative except as noted above  Respiratory:  Negative except as noted above  Gastrointestinal:  Negative except as noted above  Musculoskeletal:  Negative except as noted above  Skin: Negative except as noted above  Neurologic: Negative except as noted above  Psychiatric: Negative except as noted above  Endocrine: Negative except as noted above  Heme/Lymphatic: Negative except as noted above   Allergic/Immunologic: Negative except as noted above      Objective:      Vitals:    11/14/17 1905   BP: 122/80   Patient Site: Right Arm   Patient Position: Sitting   Cuff Size: Adult Regular   Pulse: 60   Weight: 162 lb 9.6 oz (73.8  "kg)   Height: 5' 3.5\" (1.613 m)     Wt Readings from Last 3 Encounters:   11/14/17 162 lb 9.6 oz (73.8 kg)   05/10/17 169 lb (76.7 kg)   10/05/16 168 lb (76.2 kg)     Body mass index is 28.35 kg/(m^2). (>25?)    Physical Exam:  General Appearance: Alert, cooperative, no distress.  Head: Normocephalic, without obvious abnormality, atraumatic  Eyes: PERRL, conjunctiva/corneas clear, EOM's intact  Ears: Normal TM's and external ear canals, both ears  Nose: Nares normal, septum midline,mucosa normal, no drainage  Throat: Lips, mucosa, and tongue normal, no erythema, exudate, or thrush   Neck: Supple, symmetrical, trachea midline, no adenopathy; thyroid: not enlarged, symmetric, no tenderness/mass/nodules  Back: Symmetric, no curvature, ROM normal, no CVA tenderness  Lungs: Clear to auscultation bilaterally, respirations unlabored  Breasts: Deferred; performs SBEs  Heart: Regular rate and rhythm, S1 and S2 normal, no murmur, rub, or gallop  Abdomen: Soft, mildly TTP mid-epigastric and LUQ, bowel sounds active all four quadrants,  no masses, no organomegaly  Pelvic: Deferred  Extremities: Extremities normal, atraumatic, no cyanosis or edema  Skin: Skin color, texture, turgor normal, no rashes or lesions  Lymph nodes: Cervical, supraclavicular, and axillary nodes normal  Neurologic: Normal  Psych: Normal affect.  Does not appear anxious or depressed.     Diagnostics:   Results for orders placed or performed in visit on 09/23/16   Lipid Cascade   Result Value Ref Range    Cholesterol 246 (H) <=199 mg/dL    Triglycerides 122 <=149 mg/dL    HDL Cholesterol 60 >=50 mg/dL    LDL Calculated 162 (H) <=129 mg/dL    Patient Fasting > 8hrs? Yes    Comprehensive Metabolic Panel   Result Value Ref Range    Sodium 141 136 - 145 mmol/L    Potassium 4.4 3.5 - 5.0 mmol/L    Chloride 107 98 - 107 mmol/L    CO2 23 22 - 31 mmol/L    Anion Gap, Calculation 11 5 - 18 mmol/L    Glucose 85 70 - 125 mg/dL    BUN 8 8 - 22 mg/dL    Creatinine 0.67 " 0.60 - 1.10 mg/dL    GFR MDRD Af Amer >60 >60 mL/min/1.73m2    GFR MDRD Non Af Amer >60 >60 mL/min/1.73m2    Bilirubin, Total 0.4 0.0 - 1.0 mg/dL    Calcium 9.7 8.5 - 10.5 mg/dL    Protein, Total 6.8 6.0 - 8.0 g/dL    Albumin 4.1 3.5 - 5.0 g/dL    Alkaline Phosphatase 71 45 - 120 U/L    AST 23 0 - 40 U/L    ALT 22 0 - 45 U/L   HM2(CBC w/o Differential)   Result Value Ref Range    WBC 6.0 4.0 - 11.0 thou/uL    RBC 4.80 3.80 - 5.40 mill/uL    Hemoglobin 14.4 12.0 - 16.0 g/dL    Hematocrit 42.2 35.0 - 47.0 %    MCV 88 80 - 100 fL    MCH 30.0 27.0 - 34.0 pg    MCHC 34.2 32.0 - 36.0 g/dL    RDW 11.9 11.0 - 14.5 %    Platelets 232 140 - 440 thou/uL    MPV 8.5 7.0 - 10.0 fL   Vitamin D, Total (25-Hydroxy)   Result Value Ref Range    Vitamin D, Total (25-Hydroxy) 28.3 (L) 30.0 - 80.0 ng/mL        Edin Valdez, KENNEDY  Internal Medicine  [facility]

## 2021-06-16 PROBLEM — K63.5 COLON POLYP: Status: ACTIVE | Noted: 2018-05-07

## 2021-06-16 PROBLEM — F41.1 GENERALIZED ANXIETY DISORDER: Status: ACTIVE | Noted: 2020-10-15

## 2021-06-16 PROBLEM — R12 HEARTBURN: Status: ACTIVE | Noted: 2020-10-14

## 2021-06-16 PROBLEM — D12.3 BENIGN NEOPLASM OF TRANSVERSE COLON: Status: ACTIVE | Noted: 2018-05-01

## 2021-06-16 PROBLEM — R19.8 IRREGULAR BOWEL HABITS: Status: ACTIVE | Noted: 2018-01-05

## 2021-06-16 PROBLEM — K64.4 RESIDUAL HEMORRHOIDAL SKIN TAGS: Status: ACTIVE | Noted: 2018-05-01

## 2021-06-18 NOTE — PROGRESS NOTES
Assessment & Plan:  1. Acute post-operative pain  Patient with post op rib cage pain status post cholecystectomy.  She had a complete workup at the ER including chest CT and labs which did not show infection or PE.  Pain appears to be pleuritic in nature. Recommend trial of NSAID.  [place] in am with food.  If worsening pain/SOB - needs to be eval in ER again.  Pt has f/u with surgeon next week.        No orders of the defined types were placed in this encounter.    There are no discontinued medications.    No Follow-up on file.        Chief Complaint:   Chief Complaint   Patient presents with     Follow-up     Complications from gallbladder surgery- [facility] 05/21/18-05/29/18     Concerns     hard lump in abdominal is tender. pain when coughing or sneezing       History of Present Illness:  Yu is a 57 y.o. female presenting to the clinic today for a follow up from a hospital visit on 5/16/18 and 5/21/18 through 5/29/18 for a gallbladder removal and abdominal pain. Surgery went well with no complications. After surgery she experienced abdominal pain around the surgical site exacerbated from coughing or sneezing. She has not followed up with surgeon. She notes the pain was different than the gallbladder pain. She had no infection, but was unable to take a deep breath or would initiate a stabbing feeling in her side. She notes sitting up straight and leaning back takes pressure off of her abdomen. She has been taking ibuprofen, as oxycodone 5 mg made her constipated. She will be scheduling a follow up visit with her surgeon.     Reviewed ER visit and patient had a chest CT to rule out PE.  Her labs were also unremarkable    Review of Systems:  All other systems are negative.     PFSH:    Tobacco Use:  History   Smoking Status     Former Smoker     Packs/day: 0.50     Quit date: 10/16/2010   Smokeless Tobacco     Never Used     Comment: smoked for 20 years       Vitals:  Vitals:    05/30/18 1113   BP: 122/80   Patient  Site: Left Arm   Patient Position: Sitting   Cuff Size: Adult Regular   Pulse: 64   Resp: 16   Temp: 97.5  F (36.4  C)   TempSrc: Oral   Weight: 160 lb 3 oz (72.7 kg)     Wt Readings from Last 3 Encounters:   05/30/18 160 lb 3 oz (72.7 kg)   05/23/18 169 lb (76.7 kg)   05/14/18 168 lb (76.2 kg)     Body mass index is 28.38 kg/(m^2).      Physical Exam:  Constitutional:  Reveals an alert, cooperative, pleasant female in no acute distress.  Vitals:  Per nursing notes.  Cardiac:  Regular rate and rhythm without murmurs, rubs, or gallops.  Lungs: Clear.  Respiratory effort normal.  Abdomen: Palpation of lower ribcage 3/10 pain scale.   Neurologic:  No gross focal deficits.    Psychiatric:  Mood appropriate, memory intact.     Data Reviewed:  Additional history summarized (from old records or history from someone other than the patient or another healthcare provider) (2 TOTAL): Reviewed note from 5/21/18 regarding hospital admittance/discharge summary.     Decision to obtain extra information (old records requested or history from another person or accessing Care Everywhere) (1 TOTAL): None.     Radiology tests summarized or ordered (XRAY/CT/MRI/DXA) (1 TOTAL): 1 reviewed chest CT.    Labs reviewed or ordered (1 TOTAL): Reviewed and ordered labs.     Medicine tests summarized or ordered (EKG/ECHO) (1 TOTAL): None.    Independent review of EKG or X-Ray (2 EACH): None.       The visit lasted a total of 29 minutes face to face with the patient. Over 50% of the time was spent counseling and educating the patient about coordination of care.    I, Elvia Flores, am scribing for and in the presence of, Dr. Bravo.    Nicole ZHOU MD, personally performed the services described in this documentation, as scribed by Elvia Flores in my presence, and it is both accurate and complete.    Medications:  Current Outpatient Prescriptions   Medication Sig Dispense Refill     acetaminophen (TYLENOL) 500 MG tablet Take 2 tablets (1,000  mg total) by mouth 3 (three) times a day.  0     psyllium (METAMUCIL) 0.52 gram capsule Take 0.52 g by mouth daily.       senna-docusate (PERICOLACE) 8.6-50 mg tablet Take 1 tablet by mouth 2 (two) times a day. Adjust dose as needed if stools seem excessive. 60 tablet 0     No current facility-administered medications for this visit.        Total Data Points: 4

## 2021-06-18 NOTE — ANESTHESIA PREPROCEDURE EVALUATION
Anesthesia Evaluation      Patient summary reviewed   No history of anesthetic complications     Airway   Mallampati: II  Neck ROM: full   Pulmonary - negative ROS and normal exam                          Cardiovascular - negative ROS and normal exam   Neuro/Psych - negative ROS     Endo/Other - negative ROS      GI/Hepatic/Renal - negative ROS           Dental    (+) poor dentition                       Anesthesia Plan  Planned anesthetic: general endotracheal    ASA 2   Induction: intravenous   Anesthetic plan and risks discussed with: patient

## 2021-06-18 NOTE — ANESTHESIA CARE TRANSFER NOTE
Last vitals:   Vitals:    05/16/18 1223   BP: 110/60   Pulse: 81   Resp: 10   Temp: 36.8  C (98.2  F)   SpO2: 100%     Patient's level of consciousness is drowsy  Spontaneous respirations: yes  Maintains airway independently: yes  Dentition unchanged: yes  Oropharynx: oropharynx clear of all foreign objects    QCDR Measures:  ASA# 20 - Surgical Safety Checklist: WHO surgical safety checklist completed prior to induction  PQRS# 430 - Adult PONV Prevention: 4558F - Pt received => 2 anti-emetic agents (different classes) preop & intraop  ASA# 8 - Peds PONV Prevention: NA - Not pediatric patient, not GA or 2 or more risk factors NOT present  PQRS# 424 - Princess-op Temp Management: 4559F - At least one body temp DOCUMENTED => 35.5C or 95.9F within required timeframe  PQRS# 426 - PACU Transfer Protocol: - Transfer of care checklist used  ASA# 14 - Acute Post-op Pain: ASA14B - Patient did NOT experience pain >= 7 out of 10

## 2021-06-18 NOTE — ANESTHESIA POSTPROCEDURE EVALUATION
Patient: Yu AMATO Aj  LAPAROSCOPIC CHOLECYSTECTOMY  Anesthesia type: general    Patient location: PACU  Last vitals:   Vitals:    05/16/18 1400   BP: 119/61   Pulse: 88   Resp: 18   Temp: 36.7  C (98  F)   SpO2: 99%     Post vital signs: stable  Level of consciousness: awake and responds to simple questions  Post-anesthesia pain: pain controlled  Post-anesthesia nausea and vomiting: no  Pulmonary: unassisted, return to baseline  Cardiovascular: stable and blood pressure at baseline  Hydration: adequate  Anesthetic events: no    QCDR Measures:  ASA# 11 - Princess-op Cardiac Arrest: ASA11B - Patient did NOT experience unanticipated cardiac arrest  ASA# 12 - Princess-op Mortality Rate: ASA12B - Patient did NOT die  ASA# 13 - PACU Re-Intubation Rate: ASA13B - Patient did NOT require a new airway mgmt  ASA# 10 - Composite Anes Safety: ASA10A - No serious adverse event    Additional Notes:

## 2021-06-20 NOTE — PROGRESS NOTES
ASSESSMENT/PLAN  1. Greater trochanteric bursitis of left hip  Patient provided with patient education.  Will start Aleve 1-2 tabs twice daily for pain control.  Patient to restart PPI if any stomach issues/contact us.  We will refer to physical therapy for treatment; consider follow-up with Dr. Bravo for injection of the bursa if persisting symptoms.  Defer imaging at this point.  - Ambulatory referral to Physical Therapy    2. Needs flu shot  Seasonal flu shot to be administered by nursing today          Pt states an understanding and agrees to the above plan.  Return in about 2 months (around 12/1/2018) for Annual physical.          SUBJECTIVE:   Chief Complaint   Patient presents with     Hip Pain     left hip pain when walking and sitting x 3 weeks       Uy LIMA Aj 58 y.o. female    Current Outpatient Prescriptions   Medication Sig Dispense Refill     CALCIUM ORAL Take by mouth.       cholecalciferol, vitamin D3, 1,000 unit tablet Take 1,000 Units by mouth 2 (two) times a day.       psyllium (METAMUCIL) 0.52 gram capsule Take 0.52 g by mouth daily.       No current facility-administered medications for this visit.      Allergies: Bupropion hcl and Tramadol   No LMP recorded. Patient has had a hysterectomy.    HPI:   Bindu is a 58-year-old female comes in today for complaint of some left hip pain.  This is new for her.  About 3-1/2 weeks ago she states she was going down the stairs when her dog sideswiped her and she fell into the railing hitting her left hip.  It was immediately painful and is persisted since then.  She states it bothers her significantly with going up and down stairs, it is impacting her sleep as she can no longer sleep on that side.  She is never had anything similar in the past.  She denies that there was any bruising or swelling related to the impact.  Patient does note that she has been having some left calf discomfort periodically over the last several months with occasional  cramping and she will take magnesium to make that go away.  No lower extremity swelling or edema.  She has not noticed any weakness of her lower extremities.  Patient has no symptoms on the opposite side.  Review of her chart shows 2 years ago she complained of some right hip pain and had an x-ray that was negative.  She remembers following up with physical therapy and has no subsequent symptoms.  Chelita is not doing any form of regular exercise other than walking her dog a couple times a week and active with caring for grandchildren 2-3 days a week.    ROS: negative except as per HPI    OBJECTIVE:   The patient appears well, alert, oriented x 3, in no distress.  /86 (Patient Site: Left Arm, Patient Position: Sitting, Cuff Size: Adult Regular)  Pulse 84  Temp 97.5  F (36.4  C) (Oral)   Resp 16  Wt 150 lb 2 oz (68.1 kg)  BMI 26.59 kg/m2    Patient pacing in the room when I come in--states it is hard to sit for prolonged period.  On exam, patient has normal flexion and extension of both legs.  Normal internal rotation with increased pain external rotation on the left.  Normal rotation on the right.  Strength testing of both lower extremities normal and normal patellar reflexes.  Pain reproducible when palpating over the left greater trochanteric bursa.  Skin intact with no bruising or changes.

## 2021-06-21 NOTE — LETTER
Letter by Lupis Gonzalez MD at      Author: Lupis Gonzalez MD Service: -- Author Type: --    Filed:  Encounter Date: 10/14/2020 Status: (Other)         Yu Rocha  [address] [facility] [id number]             October 14, 2020         Dear Ms. Aj,    Below are the results from your recent visit:    Resulted Orders   Basic Metabolic Panel   Result Value Ref Range    Sodium 140 136 - 145 mmol/L    Potassium 4.5 3.5 - 5.0 mmol/L    Chloride 105 98 - 107 mmol/L    CO2 26 22 - 31 mmol/L    Anion Gap, Calculation 9 5 - 18 mmol/L    Glucose 95 70 - 125 mg/dL    Calcium 9.7 8.5 - 10.5 mg/dL    BUN 8 8 - 22 mg/dL    Creatinine 0.70 0.60 - 1.10 mg/dL    GFR MDRD Af Amer >60 >60 mL/min/1.73m2    GFR MDRD Non Af Amer >60 >60 mL/min/1.73m2    Narrative    Fasting Glucose reference range is 70-99 mg/dL per  American Diabetes Association (ADA) guidelines.   Thyroid Cascade   Result Value Ref Range    TSH 0.58 0.30 - 5.00 uIU/mL       Your thyroid hormone level is normal.  Your electrolytes are normal as well.  Please let me know if your symptoms are not improving with therapy.    Please call with questions or contact us using [email or web address].    Sincerely,        Electronically signed by Lupis Gonzalez MD

## 2021-06-23 NOTE — PROGRESS NOTES
Assessment & Plan:  1. Herpes zoster without complication  Patient with probable shingles.  We will start her on valacyclovir 1 g 3 times a day for 7 days.  If patient develops any secondary infection or worsening pain she will follow-up    2. Encounter for tobacco use cessation counseling  Patient recently started smoking again.  She is motivated to stop smoking.  We talked about the importance of knowing her smoking habit and keeping a smoking diary for 3 days.  I counseled her for at least 10 minutes on smoking cessation.  We will have her start with the Chantix again.  If she continues to have problems with follow-up.  Also referred her to [email or web address]        Orders Placed This Encounter   Procedures     Varicella Zoster, Recombinant Vaccine IM     Standing Status:   Standing     Number of Occurrences:   2     Standing Expiration Date:   1/17/2020     Medications Discontinued During This Encounter   Medication Reason     CALCIUM ORAL Therapy completed     cholecalciferol, vitamin D3, 1,000 unit tablet Therapy completed       No Follow-up on file.        This note was created use Dragon Dictation.  There may be sound alike errors present.       Chief Complaint:   Chief Complaint   Patient presents with     Herpes Zoster     possible on left elbow       History of Present Illness:  Yu is a 58 y.o. female presenting to the clinic today for concerns of possible shingles on her left elbow.  Patient states for 3-4 days her left elbow just felt stiff lead yesterday 3 red spots popped up.  They are fluid-filled blisters and fatigue.  Patient has had shingles on that arm 2 times before.      Patient is also a smoker.  She is motivated to quit smoking.  She is frustrated that she started again and would like to quit.  She was successful with Chantix last time.  We discussed at length the importance of knowing her smoking habit and which are her habits cigarettes.     Review of Systems:  All other systems are  negative.     PFSH:  reviewed    Tobacco Use:  Social History     Tobacco Use   Smoking Status Former Smoker     Packs/day: 0.50     Last attempt to quit: 10/16/2010     Years since quittin.2   Smokeless Tobacco Never Used   Tobacco Comment    smoked for 20 years       Vitals:  Vitals:    19 1406   BP: 123/84   Patient Site: Left Arm   Patient Position: Sitting   Cuff Size: Adult Regular   Pulse: 77   Resp: 16   Temp: 96.7  F (35.9  C)   TempSrc: Oral   SpO2: 94%   Weight: 141 lb 6.4 oz (64.1 kg)     Wt Readings from Last 3 Encounters:   19 141 lb 6.4 oz (64.1 kg)   10/01/18 150 lb 2 oz (68.1 kg)   18 160 lb 3 oz (72.7 kg)     Body mass index is 25.05 kg/m .      Physical Exam:  Constitutional:  Reveals an alert, cooperative,  female in no acute distress.  Vitals:  Per nursing notes.  Neck:  Supple, no lymphadenopathy,  thyroid not palpable.  Cardiac:  Regular rate and rhythm without murmurs, rubs, or gallops.   Lungs: Clear.  Respiratory effort normal.  Skin:   Left elbow - 2 pustules  Neurologic:  No gross focal deficits.    Psychiatric:  Mood appropriate, memory intact.     Data Reviewed:  Additional history summarized (from old records or history from someone other than the patient or another healthcare provider) (2 TOTAL): 2 reviewed last note.     Decision to obtain extra information (old records requested or history from another person or accessing Care Everywhere) (1 TOTAL): None.     Radiology tests summarized or ordered (XRAY/CT/MRI/DXA) (1 TOTAL): None.    Labs reviewed or ordered (1 TOTAL): None.    Medicine tests summarized or ordered (EKG/ECHO) (1 TOTAL): None.    Independent review of EKG or X-Ray (2 EACH): None.       The visit lasted a total of 26 minutes face to face with the patient. Over 50% of the time was spent counseling and educating the patient about shingles.        Medications:  Current Outpatient Medications   Medication Sig Dispense Refill     psyllium (METAMUCIL)  0.52 gram capsule Take 0.52 g by mouth daily.       varenicline (CHANTIX GARRETT) 0.5 mg (11)- 1 mg (42) tablet Take 0.5mg tablet daily for 3 days, then increase 0.5mg tablet twice daily for 3 days, then increase to 1mg tablet twice daily. 42 tablet 0     No current facility-administered medications for this visit.        Total Data Points:

## 2021-06-24 NOTE — TELEPHONE ENCOUNTER
Refill Approved    Rx renewed per Medication Renewal Policy. Medication was last renewed on 1/17/19.    Ov: 1/17/19    Kaela Beatty, [facility] Triage/Med Refill 2/22/2019     Requested Prescriptions   Pending Prescriptions Disp Refills     varenicline (CHANTIX STARTING MONTH BOX) 0.5 mg (11)- 1 mg (42) tablet [Pharmacy Med Name: CHANTIX 0.5& 1MG STARTER  GARRETT] 53 tablet 0     Sig: TAKE 0.5MG TABLET DAILY FOR 3 DAYS, THEN INCREASE TO 0.5MG TABLET TWICE DAILY FOR 3 DAYS, THEN INCREASE TO 1MG TABLET TWICE DAILY    Varenicline Refill Protocol Passed - 2/21/2019  3:03 PM       Passed - Normal Serum Creatinine in past 12 months     Creatinine   Date Value Ref Range Status   05/23/2018 0.63 0.60 - 1.10 mg/dL Final            Passed - PCP or prescribing provider visit in last 12 or next 3 months.    Last office visit with prescriber/PCP: 1/17/2019 Nicole Bravo MD OR same dept: 1/17/2019 Nicole Bravo MD  Last physical: Visit date not found       Next visit within 3 mo: Visit date not found  Next physical within 3 mo: Visit date not found  Prescriber OR PCP: Nicole Bravo MD  Last diagnosis associated with med order: There are no diagnoses linked to this encounter.   Requested Prescriptions     Pending Prescriptions Disp Refills     varenicline (CHANTIX STARTING MONTH BOX) 0.5 mg (11)- 1 mg (42) tablet [Pharmacy Med Name: CHANTIX 0.5& 1MG STARTER  GARRETT] 53 tablet 0     Sig: TAKE 0.5MG TABLET DAILY FOR 3 DAYS, THEN INCREASE TO 0.5MG TABLET TWICE DAILY FOR 3 DAYS, THEN INCREASE TO 1MG TABLET TWICE DAILY     May refill for 3 months if protocol passes.

## 2021-06-25 NOTE — PROGRESS NOTES
Progress Notes by Jabier Singh DO at 5/10/2017 12:20 PM     Author: Jabier Singh DO Service: -- Author Type: Physician    Filed: 5/10/2017  3:12 PM Encounter Date: 5/10/2017 Status: Signed    : Jabier Singh DO (Physician)       Chief Complaint   Patient presents with   ? Cough     worse at night, productive x 1.5 weeks        History of Present Illness: Nursing notes reviewed. She felt feverish 5 days ago. No shortness of breath, but she feels a heaviness in her substernal region.     Review of systems: See history of present illness, otherwise negative.     Current Outpatient Prescriptions   Medication Sig Dispense Refill   ? CA/D3/MAG OX/ZINC//SUNIL/BOR (CALCIUM 600+D3 PLUS ORAL) Take by mouth daily.     ? cholecalciferol, vitamin D3, (VITAMIN D3) 1,000 unit capsule Take 1,000 Units by mouth daily.     ? FIBER, HERBAL, ORAL Take by mouth 2 (two) times a day.     ? MAGNESIUM OXIDE/MAG AA CHELATE (MAGNESIUM, OXIDE/AA CHELATE, ORAL) Take by mouth daily.     ? pantoprazole (PROTONIX) 40 MG tablet Take 1 tablet (40 mg total) by mouth daily. 90 tablet 3   ? albuterol (PROAIR HFA;PROVENTIL HFA;VENTOLIN HFA) 90 mcg/actuation inhaler Inhale 2 puffs every 6 (six) hours as needed for wheezing. 1 each 0   ? azithromycin (ZITHROMAX Z-GARRETT) 250 MG tablet Take 2 tablets (500 mg) on  Day 1,  followed by 1 tablet (250 mg) once daily on Days 2 through 5. 6 tablet 0   ? benzonatate (TESSALON) 200 MG capsule Take 1 capsule (200 mg total) by mouth 3 (three) times a day as needed for cough. 20 capsule 0     No current facility-administered medications for this visit.        Past Medical History:   Diagnosis Date   ? Fibrocystic breast    ? Hemorrhoids       Past Surgical History:   Procedure Laterality Date   ? BREAST BIOPSY Right 2005    benign   ? HYSTERECTOMY  1992    benign   ? OOPHORECTOMY  1991   ? DC APPENDECTOMY      Description: Appendectomy;  Recorded: 07/08/2008;   ? [facility] BIOPSY OF BREAST, INCISIONAL       Description: Biopsy Breast Open;  Recorded: 2008;  Comments: benign--fi brocystic changes   ? [facility]  DELIVERY ONLY      Description:  Section;  Recorded: 2008;   ? [facility] DILATION/CURETTAGE,DIAGNOSTIC      Description: Dilation And Curettage;  Recorded: 2008;  Comments: for miscarriage   ? [facility] VAGINAL HYSTERECTOMY,UTERUS 250 GMS/<      Description: Vaginal Hysterectomy;  Recorded: 2008;  Comments: single oophorectomy      Social History     Social History   ? Marital status:      Spouse name: N/A   ? Number of children: N/A   ? Years of education: N/A     Social History Main Topics   ? Smoking status: Former Smoker   ? Smokeless tobacco: None      Comment: smoked for 20 years   ? Alcohol use No   ? Drug use: None   ? Sexual activity: Not Asked     Other Topics Concern   ? None     Social History Narrative       History   Smoking Status   ? Former Smoker   Smokeless Tobacco   ? Not on file     Comment: smoked for 20 years      Exam:   Blood pressure 100/60, pulse 77, temperature 98.1  F (36.7  C), temperature source Oral, resp. rate 16, weight 169 lb (76.7 kg), SpO2 95 %, not currently breastfeeding.    EXAM:   General: Vital signs reviewed. Patient is in no acute appearing distress except for rare cough. Breathing is non labored appearing. Patient is alert and oriented x 3.   ENT: Tympanic membranes are clear and without injection bilaterally, nasal turbinates show no injection or rhinorrhea, no pharyngeal injection or exudate.  Neck: supple with some submental tenderness  Heart:  Heart rate is normal, regular rhythm without murmur.  Lungs:  Lung sounds are clear to auscultation with good airflow except for crackles noted in right lower lobe.  Skin: warm and dry    Assessment/Plan   1. RLL pneumonia  azithromycin (ZITHROMAX Z-GARRETT) 250 MG tablet    albuterol (PROAIR HFA;PROVENTIL HFA;VENTOLIN HFA) 90 mcg/actuation inhaler   2. Cough  benzonatate (TESSALON) 200 MG capsule        Patient Instructions     Also see info below. Be seen again in 3-4 days if symptoms are not better, sooner if feeling any worse.    What is Pneumonia?    Pneumonia is a serious lung infection. Many cases of pneumonia are caused by bacteria or viruses. Other less common causes include    Fungi    Chemicals    Gases  You may also get pneumonia after another illness, such as a cold, flu, or bronchitis. Those most at risk include the elderly and people with chronic health problems.  Healthy Lungs    Air travels in and out of the lungs through tubes called airways.    The tubes branch into smaller passages called bronchioles. These end in balloonlike sacs called alveoli.    Blood vessels surrounding the alveoli take oxygen into the bloodstream. At the same time, the alveoli remove carbon dioxide (a waste gas) from the blood. The carbon dioxide is then exhaled.  When You Have Pneumonia    Pneumonia causes the bronchioles and the alveoli to fill with excess mucus and become inflamed.    Your bodys response may be to cough. This can help clear out the fluid.    The fluid (or mucus) you cough up may appear green or dark yellow.    The excess mucus may make you feel short of breath.    The inflammation and infection may give you a fever.  What are the Symptoms?  Symptoms of pneumonia can come without warning. At first, you may think you have a cold or flu. But symptoms may get worse quickly, turning into pneumonia. Symyptoms can be different for bacterial and viral pneumonia. Common symptoms may include the following:    Severe cough with green or yellow mucus that doesn't improve or gets worse    Fever and chills    Nausea, vomiting or diarrhea    Shortness of breath with normal daily activities    Increased heart rate    Chest pain or discomfort when breathing in or coughing    Headache    Excessive sweating and clammy skin    4405-6117 [employer]. [address] [place] [id number]. All rights  reserved. This information is not intended as a substitute for professional medical care. Always follow your healthcare professional's instructions.        Discharge Instructions for Pneumonia  You have been diagnosed with pneumonia, a serious lung infection. Most cases of pneumonia are caused by bacteria. Pneumonia most often occurs in older adults, young children, and people with chronic health problems.  Home care    Take your medication exactly as directed. Dont skip doses. Continue taking your antibiotics as directed until they are all gone -- even if you start to feel better. This will prevent the pneumonia from coming back.    Drink at least 8 glasses of water daily, unless directed otherwise. This helps to loosen and thin secretions so that you can cough them up.    Use a cool-mist humidifier in your bedroom. Be sure to clean the humidifier daily.    Coughing up mucus is normal. Dont use medications to suppress your cough unless your cough is dry, painful, or interferes with your sleep. You may use an expectorant if ordered by your doctor.    Warm compresses or a heating pad on the lowest setting can be used to relieve chest discomfort. Use several times a day for 15 to 20 minutes at a time. (To prevent injuring your skin, be sure the temperature of the compress or heating pad is warm, not hot.)    Get plenty of rest until your fever, shortness of breath, and chest pain go away.    Plan to get a flu shot every year.    Ask your doctor about a pneumonia vaccination.  Follow-up care  Make a follow-up appointment as directed by our staff.     When to seek medical care  Call 911 right away if you have any of the following:    Chest pain    Trouble breathing    Blue lips or fingernails  Otherwise, call your doctor if you have any of the following:    Fever above 101.5 F  (38.6 C)    Yellow, green, bloody, or smelly sputum    More than normal mucus production    Vomiting     6800-4305 [employer]  [address] [place] [id number]. All rights reserved. This information is not intended as a substitute for professional medical care. Always follow your healthcare professional's instructions.           Jabier Singh,

## 2021-07-13 ENCOUNTER — RECORDS - HEALTHEAST (OUTPATIENT)
Dept: ADMINISTRATIVE | Facility: CLINIC | Age: 61
End: 2021-07-13

## 2021-07-21 ENCOUNTER — RECORDS - HEALTHEAST (OUTPATIENT)
Dept: ADMINISTRATIVE | Facility: CLINIC | Age: 61
End: 2021-07-21

## 2023-01-25 ENCOUNTER — APPOINTMENT (OUTPATIENT)
Dept: CT IMAGING | Facility: HOSPITAL | Age: 63
End: 2023-01-25
Attending: EMERGENCY MEDICINE
Payer: COMMERCIAL

## 2023-01-25 ENCOUNTER — HOSPITAL ENCOUNTER (EMERGENCY)
Facility: HOSPITAL | Age: 63
Discharge: HOME | End: 2023-01-25
Attending: EMERGENCY MEDICINE | Admitting: EMERGENCY MEDICINE
Payer: COMMERCIAL

## 2023-01-25 ENCOUNTER — APPOINTMENT (OUTPATIENT)
Dept: RADIOLOGY | Facility: HOSPITAL | Age: 63
End: 2023-01-25
Attending: EMERGENCY MEDICINE
Payer: COMMERCIAL

## 2023-01-25 VITALS
SYSTOLIC BLOOD PRESSURE: 180 MMHG | DIASTOLIC BLOOD PRESSURE: 91 MMHG | OXYGEN SATURATION: 94 % | BODY MASS INDEX: 29.23 KG/M2 | WEIGHT: 165 LBS | HEART RATE: 89 BPM | RESPIRATION RATE: 18 BRPM | TEMPERATURE: 97.8 F

## 2023-01-25 DIAGNOSIS — R06.00 DYSPNEA, UNSPECIFIED TYPE: ICD-10-CM

## 2023-01-25 DIAGNOSIS — R91.8 RIGHT LOWER LOBE LUNG MASS: ICD-10-CM

## 2023-01-25 LAB
ALBUMIN UR-MCNC: NEGATIVE MG/DL
ANION GAP SERPL CALCULATED.3IONS-SCNC: 9 MMOL/L (ref 7–15)
APPEARANCE UR: CLEAR
BASOPHILS # BLD AUTO: 0 10E3/UL (ref 0–0.2)
BASOPHILS NFR BLD AUTO: 1 %
BILIRUB UR QL STRIP: NEGATIVE
BUN SERPL-MCNC: 7.7 MG/DL (ref 8–23)
CALCIUM SERPL-MCNC: 9.2 MG/DL (ref 8.8–10.2)
CHLORIDE SERPL-SCNC: 104 MMOL/L (ref 98–107)
COLOR UR AUTO: ABNORMAL
CREAT SERPL-MCNC: 0.73 MG/DL (ref 0.51–0.95)
D DIMER PPP FEU-MCNC: 0.64 UG/ML FEU (ref 0–0.5)
DEPRECATED HCO3 PLAS-SCNC: 25 MMOL/L (ref 22–29)
EOSINOPHIL # BLD AUTO: 0.3 10E3/UL (ref 0–0.7)
EOSINOPHIL NFR BLD AUTO: 4 %
ERYTHROCYTE [DISTWIDTH] IN BLOOD BY AUTOMATED COUNT: 12.1 % (ref 10–15)
GFR SERPL CREATININE-BSD FRML MDRD: >90 ML/MIN/1.73M2
GLUCOSE SERPL-MCNC: 99 MG/DL (ref 70–99)
GLUCOSE UR STRIP-MCNC: NEGATIVE MG/DL
HCT VFR BLD AUTO: 40.7 % (ref 35–47)
HGB BLD-MCNC: 13.5 G/DL (ref 11.7–15.7)
HGB UR QL STRIP: NEGATIVE
IMM GRANULOCYTES # BLD: 0 10E3/UL
IMM GRANULOCYTES NFR BLD: 0 %
KETONES UR STRIP-MCNC: NEGATIVE MG/DL
LEUKOCYTE ESTERASE UR QL STRIP: NEGATIVE
LYMPHOCYTES # BLD AUTO: 2.2 10E3/UL (ref 0.8–5.3)
LYMPHOCYTES NFR BLD AUTO: 29 %
MCH RBC QN AUTO: 29.6 PG (ref 26.5–33)
MCHC RBC AUTO-ENTMCNC: 33.2 G/DL (ref 31.5–36.5)
MCV RBC AUTO: 89 FL (ref 78–100)
MONOCYTES # BLD AUTO: 0.6 10E3/UL (ref 0–1.3)
MONOCYTES NFR BLD AUTO: 7 %
MUCOUS THREADS #/AREA URNS LPF: PRESENT /LPF
NEUTROPHILS # BLD AUTO: 4.4 10E3/UL (ref 1.6–8.3)
NEUTROPHILS NFR BLD AUTO: 59 %
NITRATE UR QL: NEGATIVE
NRBC # BLD AUTO: 0 10E3/UL
NRBC BLD AUTO-RTO: 0 /100
NT-PROBNP SERPL-MCNC: 54 PG/ML (ref 0–900)
PH UR STRIP: 6 [PH] (ref 5–7)
PLATELET # BLD AUTO: 265 10E3/UL (ref 150–450)
POTASSIUM SERPL-SCNC: 3.8 MMOL/L (ref 3.4–5.3)
RBC # BLD AUTO: 4.56 10E6/UL (ref 3.8–5.2)
RBC URINE: 1 /HPF
SODIUM SERPL-SCNC: 138 MMOL/L (ref 136–145)
SP GR UR STRIP: 1.02 (ref 1–1.03)
SQUAMOUS EPITHELIAL: 7 /HPF
TRANSITIONAL EPI: <1 /HPF
TROPONIN T SERPL HS-MCNC: <6 NG/L
TSH SERPL DL<=0.005 MIU/L-ACNC: 0.93 UIU/ML (ref 0.3–4.2)
UROBILINOGEN UR STRIP-MCNC: <2 MG/DL
WBC # BLD AUTO: 7.5 10E3/UL (ref 4–11)
WBC URINE: 2 /HPF

## 2023-01-25 PROCEDURE — 83880 ASSAY OF NATRIURETIC PEPTIDE: CPT | Performed by: EMERGENCY MEDICINE

## 2023-01-25 PROCEDURE — 93005 ELECTROCARDIOGRAM TRACING: CPT | Performed by: EMERGENCY MEDICINE

## 2023-01-25 PROCEDURE — 36415 COLL VENOUS BLD VENIPUNCTURE: CPT | Performed by: EMERGENCY MEDICINE

## 2023-01-25 PROCEDURE — 71046 X-RAY EXAM CHEST 2 VIEWS: CPT

## 2023-01-25 PROCEDURE — 85379 FIBRIN DEGRADATION QUANT: CPT | Performed by: EMERGENCY MEDICINE

## 2023-01-25 PROCEDURE — 84484 ASSAY OF TROPONIN QUANT: CPT | Performed by: EMERGENCY MEDICINE

## 2023-01-25 PROCEDURE — 81003 URINALYSIS AUTO W/O SCOPE: CPT | Performed by: EMERGENCY MEDICINE

## 2023-01-25 PROCEDURE — 85025 COMPLETE CBC W/AUTO DIFF WBC: CPT | Performed by: EMERGENCY MEDICINE

## 2023-01-25 PROCEDURE — 250N000011 HC RX IP 250 OP 636: Performed by: EMERGENCY MEDICINE

## 2023-01-25 PROCEDURE — 250N000013 HC RX MED GY IP 250 OP 250 PS 637: Performed by: EMERGENCY MEDICINE

## 2023-01-25 PROCEDURE — 71275 CT ANGIOGRAPHY CHEST: CPT

## 2023-01-25 PROCEDURE — 99285 EMERGENCY DEPT VISIT HI MDM: CPT | Mod: 25

## 2023-01-25 PROCEDURE — 93005 ELECTROCARDIOGRAM TRACING: CPT | Performed by: STUDENT IN AN ORGANIZED HEALTH CARE EDUCATION/TRAINING PROGRAM

## 2023-01-25 PROCEDURE — 84443 ASSAY THYROID STIM HORMONE: CPT | Performed by: EMERGENCY MEDICINE

## 2023-01-25 PROCEDURE — 82310 ASSAY OF CALCIUM: CPT | Performed by: EMERGENCY MEDICINE

## 2023-01-25 RX ORDER — INHALER, ASSIST DEVICES
SPACER (EA) MISCELLANEOUS
Qty: 1 EACH | Refills: 0 | Status: SHIPPED | OUTPATIENT
Start: 2023-01-25

## 2023-01-25 RX ORDER — ALBUTEROL SULFATE 90 UG/1
2 AEROSOL, METERED RESPIRATORY (INHALATION) EVERY 6 HOURS PRN
Qty: 18 G | Refills: 0 | Status: SHIPPED | OUTPATIENT
Start: 2023-01-25

## 2023-01-25 RX ORDER — ASPIRIN 81 MG/1
324 TABLET, CHEWABLE ORAL ONCE
Status: COMPLETED | OUTPATIENT
Start: 2023-01-25 | End: 2023-01-25

## 2023-01-25 RX ORDER — IOPAMIDOL 755 MG/ML
75 INJECTION, SOLUTION INTRAVASCULAR ONCE
Status: COMPLETED | OUTPATIENT
Start: 2023-01-25 | End: 2023-01-25

## 2023-01-25 RX ADMIN — IOPAMIDOL 75 ML: 755 INJECTION, SOLUTION INTRAVENOUS at 17:08

## 2023-01-25 RX ADMIN — ASPIRIN 81 MG CHEWABLE TABLET 324 MG: 81 TABLET CHEWABLE at 15:24

## 2023-01-25 NOTE — ED PROVIDER NOTES
EMERGENCY DEPARTMENT ENCOUNTER      NAME: Yu Rocha  AGE: 62 year old female  YOB: 1960  MRN: [id number]  EVALUATION DATE & TIME: No admission date for patient encounter.    PCP: Zaida Graf    ED PROVIDER: Jabier Eid M.D.      Chief Complaint   Patient presents with     Shortness of Breath         FINAL IMPRESSION:  Right lung mass  Dyspnea      ED COURSE & MEDICAL DECISION MAKING:    Pertinent Labs & Imaging studies reviewed. (See chart for details)  62 year old female presents to the Emergency Department for evaluation of shortness of breath with exertion.  Symptoms seem to have its onset when patient contracted COVID in September.  Since then patient with continued symptoms.  Notices it primarily on exertion.  Does not seem anyone in follow-up in spite of the length of symptoms.  However today patient got extremely short of breath with using the stairs in her home.  She did place to call the clinic and she referred to the emergency room.  Patient without underlying cardiac issues such as hypertension, diabetes, dyslipidemia.  Patient non-smoker.  On exam she is a well-nourished alert female mild distress.  Blood pressure borderline.  Remainder of vital signs essentially normal.  Exam was reassuring.  She has no lower extremity edema or tenderness to suggest fluid overload or DVT.  Lungs also cleared.  Possibility of long COVID versus ACS/pulmonary edema.  Patient seen and examined in the ED triage area secondary to extreme ED overcrowding.. Patient appears non toxic with stable vitals signs. Overall exam is benign.      3:00 PM I met with the patient for the initial interview and physical examination. Discussed plan for treatment and workup in the ED.    4:02 PM.  Laboratory evaluation unremarkable other than slightly elevated D-dimer.  We will proceed with CTA of the chest to evaluate for potential pulmonary embolism  And a 5:49 PM.  CTA without evidence of pulmonary embolism.  Lung  changes consistent with emphysema noted.  Patient also with right lower lobe mass consistent with primary cancer.  Results shared with patient.  Plan will be for continued outpatient evaluation.  Dyspnea likely related to underlying lung issues rather than cardiac etiology.  5:53 PM I updated the patient and her  on imaging results. We discussed a plan for discharge and close follow up with an oncologist.  Will discharge with albuterol inhaler.  At the conclusion of the encounter I discussed the results of all of the tests and the disposition. The questions were answered and return precautions provided. The patient or family acknowledged understanding and was agreeable with the care plan.       PPE: Provider wore gloves, N95 mask, eye protection, surgical cap.     Medical Decision Making    History:    Supplemental history from: Documented in chart, if applicable    External Record(s) reviewed: Documented in chart, if applicable.    Work Up:    Chart documentation includes differential considered and any EKGs or imaging independently interpreted by provider, where specified.    In additional to work up documented, I considered the following work up: Documented in chart, if applicable.    External consultation:    Discussion of management with another provider: Documented in chart, if applicable    Complicating factors:    Care impacted by chronic illness: Chronic Lung Disease    Care affected by social determinants of health: N/A    Disposition considerations: Discharge. I prescribed additional prescription strength medication(s) as charted. N/A.      MEDICATIONS GIVEN IN THE EMERGENCY:  Medications   aspirin (ASA) chewable tablet 324 mg (324 mg Oral Given 1/25/23 1524)       NEW PRESCRIPTIONS STARTED AT TODAY'S ER VISIT  Current Discharge Medication List      START taking these medications    Details   albuterol (PROAIR HFA/PROVENTIL HFA/VENTOLIN HFA) 108 (90 Base) MCG/ACT inhaler Inhale 2 puffs into the  lungs every 6 hours as needed for shortness of breath, wheezing or cough  Qty: 18 g, Refills: 0    Comments: Pharmacy may dispense brand covered by insurance (Proair, or proventil or ventolin or generic albuterol inhaler)      spacer (OPTICHAMBER JEANETTE) holding chamber Use 4 times daily with inhaler  Qty: 1 each, Refills: 0                =================================================================    HPI    Patient information was obtained from: patient    Use of Intrepreter: N/A         Yu Rocha is a 62 year old female with a pertient medical history of anxiety, diverticular disease of colon, s/p hysterectomy, s/p oophorectomy who presents to the ED for evaluation of shortness of breath. Patient reports that she had COVID in September of 2022 (~3 months ago). She states that, since then, she has had progressively worsening shortness of breath and central chest pressure. Patient states that laying flat does not provoke her shortness of breath.  Patient reports that, today, she was walking down the stairs to do laundry when she had very severe shortness of breath with associated dizziness. She states that she called her PCP, who instructed her to present to the ED.    Patient reports intermittent coughing fits at night, with a productive cough. Patient states that she has gained 30 lbs in the last 18 months, but she has had decreased activity, as well. Patient states that she quit smoking 10 years ago. Patient denies recent travel, denies lower extremity edema, melena or additional medical concerns or complaints at this time.       REVIEW OF SYSTEMS   Constitutional:  Denies fever, chills. Positive for weight gain  Respiratory:  Positive for productive cough and increased work of breathing  Cardiovascular:  Denies chest pain, palpitations. Positive for chest pressure  GI:  Denies abdominal pain, nausea, vomiting, or change in bowel or bladder habits   Musculoskeletal:  Denies any new muscle/joint  swelling  Skin:  Denies rash   Neurologic:  Denies focal weakness. Positive for dizziness  All systems negative except as marked.     PAST MEDICAL HISTORY:  Past Medical History:   Diagnosis Date     Fibrocystic breast      Hemorrhoids        PAST SURGICAL HISTORY:  Past Surgical History:   Procedure Laterality Date     APPENDECTOMY       BIOPSY BREAST Right     benign     BIOPSY OF BREAST, INCISIONAL      Description: Biopsy Breast Open;  Recorded: 2008;  Comments: benign--fi brocystic changes     CHOLECYSTECTOMY       HC DILATION/CURETTAGE DIAG/THER NON OB      Description: Dilation And Curettage;  Recorded: 2008;  Comments: for miscarriage     HYSTERECTOMY  1992    benign     LAPAROSCOPIC CHOLECYSTECTOMY N/A 2018    Procedure: LAPAROSCOPIC CHOLECYSTECTOMY;  Surgeon: Nicolas Carver MD;  Location: [facility];  Service:      OOPHORECTOMY       DC VAGINAL HYSTERECTOMY,UTERUS 250 GMS/<      Description: Vaginal Hysterectomy;  Recorded: 2008;  Comments: single oophorectomy     [facility] APPENDECTOMY      Description: Appendectomy;  Recorded: 2008;     [facility]  DELIVERY ONLY      Description:  Section;  Recorded: 2008;         CURRENT MEDICATIONS:    No current facility-administered medications for this encounter.    Current Outpatient Medications:      psyllium (METAMUCIL) 0.52 gram capsule, [PSYLLIUM (METAMUCIL) 0.52 GRAM CAPSULE] Take 0.52 g by mouth daily., Disp: , Rfl:     ALLERGIES:  Allergies   Allergen Reactions     Bupropion Hcl [Bupropion] Swelling     throat swelling       Tramadol Other (See Comments)     confusion       FAMILY HISTORY:  Family History   Problem Relation Age of Onset     Substance Abuse Father      Cancer Father         throat     Hypertension Sister      Depression Sister      Cancer Sister      Endometrial Cancer Sister 46.00     Breast Cancer Sister         in her late 40 s     Cancer Brother         throat     Breast Cancer  Mother 56.00     Cerebrovascular Disease Mother         ischemic     Diabetes Mother      Cancer Mother      No Known Problems Daughter      No Known Problems Maternal Grandmother      No Known Problems Maternal Grandfather      No Known Problems Paternal Grandmother      No Known Problems Paternal Grandfather      Breast Cancer Maternal Aunt 55.00     No Known Problems Paternal Aunt      Cerebrovascular Disease Sister         hemorrhagic     Breast Cancer Sister         in her 30 s     Hereditary Breast and Ovarian Cancer Syndrome No family hx of      Colon Cancer No family hx of      Ovarian Cancer No family hx of        SOCIAL HISTORY:   Social History     Socioeconomic History     Marital status:      Spouse name: None     Number of children: None     Years of education: None     Highest education level: None   Tobacco Use     Smoking status: Former     Packs/day: 0.50     Types: Cigarettes     Quit date: 10/16/2010     Years since quittin.2     Smokeless tobacco: Never     Tobacco comments:     smoked for 20 years   Substance and Sexual Activity     Alcohol use: Yes     Comment: Alcoholic Drinks/day: occassinally every 3 months     Drug use: No     Sexual activity: Never       VITALS:  Patient Vitals for the past 24 hrs:   BP Temp Temp src Pulse Resp SpO2 Weight   23 1412 (!) 145/77 97.8  F (36.6  C) Oral 86 16 94 % 74.8 kg (165 lb)        PHYSICAL EXAM    Constitutional:  Awake, alert, in no apparent distress  HENT:  Normocephalic, Atraumatic. Bilateral external ears normal. Oropharynx moist. Nose normal. Neck- Normal range of motion with no guarding, No midline cervical tenderness, Supple, No stridor.   Eyes:  PERRL, EOMI with no signs of entrapment, Conjunctiva normal, No discharge.   Respiratory:  Normal breath sounds, No respiratory distress, No wheezing.    Cardiovascular:  Normal heart rate, Normal rhythm, No appreciable rubs or gallops.   GI:  Soft, No tenderness, No distension, No  palpable masses  Musculoskeletal:  Intact distal pulses, No edema. Good range of motion in all major joints. No tenderness to palpation or major deformities noted.  Integument:  Warm, Dry, No erythema, No rash.   Neurologic:  Alert & oriented, Normal motor function, Normal sensory function, No focal deficits noted.   Psychiatric:  Affect normal, Judgment normal, Mood normal.     LAB:  All pertinent labs reviewed and interpreted.     Results for orders placed or performed during the hospital encounter of 01/25/23   Chest XR,  PA & LAT     Status: None    Narrative    EXAM: XR CHEST 2 VIEWS  LOCATION: [facility]  DATE/TIME: 1/25/2023 4:20 PM    INDICATION: Shortness of breath with exertion.  COMPARISON: 04/27/2018.      Impression    IMPRESSION: Negative chest. Lungs are clear. Normal heart size.       CT Chest Pulmonary Embolism w Contrast     Status: None    Narrative    EXAM: CT CHEST PULMONARY EMBOLISM W CONTRAST  LOCATION: [facility]  DATE/TIME: 1/25/2023 5:08 PM    INDICATION: Dyspnea, exertional, recent COVID  COMPARISON: 5/21/2018  TECHNIQUE: CT chest pulmonary angiogram during arterial phase injection of IV contrast. Multiplanar reformats and MIP reconstructions were performed. Dose reduction techniques were used.   CONTRAST: 75ml isovue 370    FINDINGS:  ANGIOGRAM CHEST: Pulmonary arteries are normal caliber and negative for pulmonary emboli. Thoracic aorta is negative for dissection. No CT evidence of right heart strain.    LUNGS AND PLEURA: Upper lung predominant emphysema. New spiculated superior segment right lower lobe pulmonary mass measuring 3.4 x 2.2 x 2.8 cm which tethers the major fissure. No pleural effusions.    MEDIASTINUM/AXILLAE: No enlarged lymph nodes. Small hiatal hernia.    CORONARY ARTERY CALCIFICATION: Cannot evaluate.    UPPER ABDOMEN: Cholecystectomy.    MUSCULOSKELETAL: No suspicious osseous lesions.      Impression    IMPRESSION:  1.   No pulmonary embolism.    2.  New spiculated right lower lobe mass measuring up to 3.4 cm, consistent with primary pulmonary malignancy.    3.  Emphysema.   Basic metabolic panel     Status: Abnormal   Result Value Ref Range    Sodium 138 136 - 145 mmol/L    Potassium 3.8 3.4 - 5.3 mmol/L    Chloride 104 98 - 107 mmol/L    Carbon Dioxide (CO2) 25 22 - 29 mmol/L    Anion Gap 9 7 - 15 mmol/L    Urea Nitrogen 7.7 (L) 8.0 - 23.0 mg/dL    Creatinine 0.73 0.51 - 0.95 mg/dL    Calcium 9.2 8.8 - 10.2 mg/dL    Glucose 99 70 - 99 mg/dL    GFR Estimate >90 >60 mL/min/1.73m2   Troponin T, High Sensitivity     Status: Normal   Result Value Ref Range    Troponin T, High Sensitivity <6 <=14 ng/L   Nt probnp inpatient     Status: Normal   Result Value Ref Range    N terminal Pro BNP Inpatient 54 0 - 900 pg/mL   UA with Microscopic reflex to Culture     Status: Abnormal    Specimen: Urine, Clean Catch   Result Value Ref Range    Color Urine Light Yellow Colorless, Straw, Light Yellow, Yellow    Appearance Urine Clear Clear    Glucose Urine Negative Negative mg/dL    Bilirubin Urine Negative Negative    Ketones Urine Negative Negative mg/dL    Specific Gravity Urine 1.021 1.001 - 1.030    Blood Urine Negative Negative    pH Urine 6.0 5.0 - 7.0    Protein Albumin Urine Negative Negative mg/dL    Urobilinogen Urine <2.0 <2.0 mg/dL    Nitrite Urine Negative Negative    Leukocyte Esterase Urine Negative Negative    Mucus Urine Present (A) None Seen /LPF    RBC Urine 1 <=2 /HPF    WBC Urine 2 <=5 /HPF    Squamous Epithelials Urine 7 (H) <=1 /HPF    Transitional Epithelials Urine <1 <=1 /HPF    Narrative    Urine Culture not indicated   TSH with free T4 reflex     Status: Normal   Result Value Ref Range    TSH 0.93 0.30 - 4.20 uIU/mL   D dimer quantitative     Status: Abnormal   Result Value Ref Range    D-Dimer Quantitative 0.64 (H) 0.00 - 0.50 ug/mL FEU    Narrative    This D-dimer assay is intended for use in conjunction with a clinical  pretest probability assessment model to exclude pulmonary embolism (PE) and deep venous thrombosis (DVT) in outpatients suspected of PE or DVT. The cut-off value is 0.50 ug/mL FEU.   CBC with platelets and differential     Status: None   Result Value Ref Range    WBC Count 7.5 4.0 - 11.0 10e3/uL    RBC Count 4.56 3.80 - 5.20 10e6/uL    Hemoglobin 13.5 11.7 - 15.7 g/dL    Hematocrit 40.7 35.0 - 47.0 %    MCV 89 78 - 100 fL    MCH 29.6 26.5 - 33.0 pg    MCHC 33.2 31.5 - 36.5 g/dL    RDW 12.1 10.0 - 15.0 %    Platelet Count 265 150 - 450 10e3/uL    % Neutrophils 59 %    % Lymphocytes 29 %    % Monocytes 7 %    % Eosinophils 4 %    % Basophils 1 %    % Immature Granulocytes 0 %    NRBCs per 100 WBC 0 <1 /100    Absolute Neutrophils 4.4 1.6 - 8.3 10e3/uL    Absolute Lymphocytes 2.2 0.8 - 5.3 10e3/uL    Absolute Monocytes 0.6 0.0 - 1.3 10e3/uL    Absolute Eosinophils 0.3 0.0 - 0.7 10e3/uL    Absolute Basophils 0.0 0.0 - 0.2 10e3/uL    Absolute Immature Granulocytes 0.0 <=0.4 10e3/uL    Absolute NRBCs 0.0 10e3/uL   CBC with Platelets & Differential     Status: None    Narrative    The following orders were created for panel order CBC with Platelets & Differential.  Procedure                               Abnormality         Status                     ---------                               -----------         ------                     CBC with platelets and d...[[id number]]                      Final result                 Please view results for these tests on the individual orders.     RADIOLOGY:  Reviewed all pertinent imaging. Please see official radiology report.  Chest XR,  PA & LAT    (Results Pending)       EKG:    Normal sinus rhythm.  Rate of 87.  Normal QRS.  Normal ST segments.  Normal EKG.  I have independently reviewed and interpreted the EKG(s) documented above.          I, Elise Gryler, am serving as a scribe to document services personally performed by Jabier Eid MD, based on my observation and the  provider's statements to me. I, Jabier Eid MD attest that Mairae Gryler is acting in a scribe capacity, has observed my performance of the services and has documented them in accordance with my direction.    Jabier Eid M.D.  Emergency Medicine  [facility] EMERGENCY DEPARTMENT       Jabier Eid MD  01/25/23 1807       Jabier Eid MD  01/25/23 1800

## 2023-01-25 NOTE — ED TRIAGE NOTES
Patient presents here for evaluation of prolonged shortness of breath and chest tightness since having Covid in September. She is noting increasing activity intolerance over the past week and experiences chest pressure with the activity. Currently, her symptome are resolved, but did find herself very short of breath when she walked in from her car.

## 2023-01-26 NOTE — DISCHARGE INSTRUCTIONS
Your CT scan today demonstrated an isolated right lower lobe lung mass consistent with a cancer.  You need to call your [facility] provider in the morning and tell them of your visit and your CT findings.  They need to schedule you with follow-up with oncology soon as possible.

## 2024-05-20 ENCOUNTER — HOSPITAL ENCOUNTER (EMERGENCY)
Facility: HOSPITAL | Age: 64
Discharge: HOME | End: 2024-05-20
Admitting: STUDENT IN AN ORGANIZED HEALTH CARE EDUCATION/TRAINING PROGRAM
Payer: COMMERCIAL

## 2024-05-20 ENCOUNTER — APPOINTMENT (OUTPATIENT)
Dept: RADIOLOGY | Facility: HOSPITAL | Age: 64
End: 2024-05-20
Attending: EMERGENCY MEDICINE
Payer: COMMERCIAL

## 2024-05-20 VITALS
OXYGEN SATURATION: 95 % | TEMPERATURE: 98.9 F | HEART RATE: 95 BPM | SYSTOLIC BLOOD PRESSURE: 136 MMHG | HEIGHT: 62 IN | WEIGHT: 160 LBS | DIASTOLIC BLOOD PRESSURE: 73 MMHG | RESPIRATION RATE: 18 BRPM | BODY MASS INDEX: 29.44 KG/M2

## 2024-05-20 DIAGNOSIS — J44.1 COPD EXACERBATION (H): ICD-10-CM

## 2024-05-20 LAB
ANION GAP SERPL CALCULATED.3IONS-SCNC: 10 MMOL/L (ref 7–15)
BASOPHILS # BLD AUTO: 0 10E3/UL (ref 0–0.2)
BASOPHILS NFR BLD AUTO: 1 %
BUN SERPL-MCNC: 9 MG/DL (ref 8–23)
CALCIUM SERPL-MCNC: 9.2 MG/DL (ref 8.8–10.2)
CHLORIDE SERPL-SCNC: 105 MMOL/L (ref 98–107)
CREAT SERPL-MCNC: 0.61 MG/DL (ref 0.51–0.95)
D DIMER PPP FEU-MCNC: 0.54 UG/ML FEU (ref 0–0.5)
DEPRECATED HCO3 PLAS-SCNC: 24 MMOL/L (ref 22–29)
EGFRCR SERPLBLD CKD-EPI 2021: >90 ML/MIN/1.73M2
EOSINOPHIL # BLD AUTO: 0.4 10E3/UL (ref 0–0.7)
EOSINOPHIL NFR BLD AUTO: 6 %
ERYTHROCYTE [DISTWIDTH] IN BLOOD BY AUTOMATED COUNT: 12 % (ref 10–15)
FLUAV RNA SPEC QL NAA+PROBE: NEGATIVE
FLUBV RNA RESP QL NAA+PROBE: NEGATIVE
GLUCOSE SERPL-MCNC: 106 MG/DL (ref 70–99)
HCT VFR BLD AUTO: 42.2 % (ref 35–47)
HGB BLD-MCNC: 14 G/DL (ref 11.7–15.7)
IMM GRANULOCYTES # BLD: 0 10E3/UL
IMM GRANULOCYTES NFR BLD: 0 %
LYMPHOCYTES # BLD AUTO: 1.6 10E3/UL (ref 0.8–5.3)
LYMPHOCYTES NFR BLD AUTO: 24 %
MCH RBC QN AUTO: 29.5 PG (ref 26.5–33)
MCHC RBC AUTO-ENTMCNC: 33.2 G/DL (ref 31.5–36.5)
MCV RBC AUTO: 89 FL (ref 78–100)
MONOCYTES # BLD AUTO: 0.5 10E3/UL (ref 0–1.3)
MONOCYTES NFR BLD AUTO: 7 %
NEUTROPHILS # BLD AUTO: 4 10E3/UL (ref 1.6–8.3)
NEUTROPHILS NFR BLD AUTO: 62 %
NRBC # BLD AUTO: 0 10E3/UL
NRBC BLD AUTO-RTO: 0 /100
PLATELET # BLD AUTO: 261 10E3/UL (ref 150–450)
POTASSIUM SERPL-SCNC: 4.4 MMOL/L (ref 3.4–5.3)
RBC # BLD AUTO: 4.74 10E6/UL (ref 3.8–5.2)
RSV RNA SPEC NAA+PROBE: NEGATIVE
SARS-COV-2 RNA RESP QL NAA+PROBE: NEGATIVE
SODIUM SERPL-SCNC: 139 MMOL/L (ref 135–145)
TROPONIN T SERPL HS-MCNC: <6 NG/L
WBC # BLD AUTO: 6.4 10E3/UL (ref 4–11)

## 2024-05-20 PROCEDURE — 87637 SARSCOV2&INF A&B&RSV AMP PRB: CPT | Performed by: EMERGENCY MEDICINE

## 2024-05-20 PROCEDURE — 85025 COMPLETE CBC W/AUTO DIFF WBC: CPT | Performed by: EMERGENCY MEDICINE

## 2024-05-20 PROCEDURE — 93005 ELECTROCARDIOGRAM TRACING: CPT

## 2024-05-20 PROCEDURE — 93005 ELECTROCARDIOGRAM TRACING: CPT | Performed by: EMERGENCY MEDICINE

## 2024-05-20 PROCEDURE — 250N000009 HC RX 250: Performed by: EMERGENCY MEDICINE

## 2024-05-20 PROCEDURE — 71046 X-RAY EXAM CHEST 2 VIEWS: CPT

## 2024-05-20 PROCEDURE — 99285 EMERGENCY DEPT VISIT HI MDM: CPT | Mod: 25

## 2024-05-20 PROCEDURE — 85379 FIBRIN DEGRADATION QUANT: CPT | Performed by: EMERGENCY MEDICINE

## 2024-05-20 PROCEDURE — 36415 COLL VENOUS BLD VENIPUNCTURE: CPT | Performed by: EMERGENCY MEDICINE

## 2024-05-20 PROCEDURE — 80048 BASIC METABOLIC PNL TOTAL CA: CPT | Performed by: EMERGENCY MEDICINE

## 2024-05-20 PROCEDURE — 94640 AIRWAY INHALATION TREATMENT: CPT

## 2024-05-20 PROCEDURE — 84484 ASSAY OF TROPONIN QUANT: CPT | Performed by: EMERGENCY MEDICINE

## 2024-05-20 RX ORDER — IPRATROPIUM BROMIDE AND ALBUTEROL SULFATE 2.5; .5 MG/3ML; MG/3ML
1 SOLUTION RESPIRATORY (INHALATION) EVERY 4 HOURS PRN
Qty: 90 ML | Refills: 0 | Status: SHIPPED | OUTPATIENT
Start: 2024-05-20

## 2024-05-20 RX ORDER — PREDNISONE 20 MG/1
TABLET ORAL
Qty: 10 TABLET | Refills: 0 | Status: SHIPPED | OUTPATIENT
Start: 2024-05-20

## 2024-05-20 RX ORDER — SOFT LENS DISINFECTANT
1 SOLUTION, NON-ORAL MISCELLANEOUS EVERY 4 HOURS PRN
Qty: 1 EACH | Refills: 0 | Status: SHIPPED | OUTPATIENT
Start: 2024-05-20 | End: 2024-06-19

## 2024-05-20 RX ORDER — IPRATROPIUM BROMIDE AND ALBUTEROL SULFATE 2.5; .5 MG/3ML; MG/3ML
3 SOLUTION RESPIRATORY (INHALATION) ONCE
Status: COMPLETED | OUTPATIENT
Start: 2024-05-20 | End: 2024-05-20

## 2024-05-20 RX ADMIN — IPRATROPIUM BROMIDE AND ALBUTEROL SULFATE 3 ML: .5; 3 SOLUTION RESPIRATORY (INHALATION) at 11:49

## 2024-05-20 ASSESSMENT — ENCOUNTER SYMPTOMS
CHILLS: 0
WHEEZING: 1
FEVER: 0
SORE THROAT: 0
SHORTNESS OF BREATH: 0
COUGH: 1
RHINORRHEA: 1

## 2024-05-20 ASSESSMENT — COLUMBIA-SUICIDE SEVERITY RATING SCALE - C-SSRS
1. IN THE PAST MONTH, HAVE YOU WISHED YOU WERE DEAD OR WISHED YOU COULD GO TO SLEEP AND NOT WAKE UP?: NO
2. HAVE YOU ACTUALLY HAD ANY THOUGHTS OF KILLING YOURSELF IN THE PAST MONTH?: NO
6. HAVE YOU EVER DONE ANYTHING, STARTED TO DO ANYTHING, OR PREPARED TO DO ANYTHING TO END YOUR LIFE?: NO

## 2024-05-20 ASSESSMENT — ACTIVITIES OF DAILY LIVING (ADL)
ADLS_ACUITY_SCORE: 35
ADLS_ACUITY_SCORE: 35

## 2024-05-20 NOTE — ED PROVIDER NOTES
EMERGENCY DEPARTMENT ENCOUNTER      NAME: Yu Rocha  AGE: 63 year old female  YOB: 1960  MRN: [id number]  EVALUATION DATE & TIME: No admission date for patient encounter.    PCP: System, Provider Not In    ED PROVIDER: Cindy Bellamy PA-C      Chief Complaint   Patient presents with    Wheezing         FINAL IMPRESSION:  1. COPD exacerbation (H)          ED COURSE & MEDICAL DECISION MAKING:    Pertinent Labs & Imaging studies reviewed. (See chart for details)    63 year old female presents to the Emergency Department for evaluation of wheezing.    Physical exam is remarkable for a generally well-appearing female who is in no acute distress.  Heart and lung sounds are remarkable for diminished breath sounds throughout, no respiratory distress.  Abdomen is soft and nontender.  Vital signs initially remarkable for mild hypertension which improved without intervention, borderline low oxygen at 93% but otherwise stable and she is afebrile.    CBC is unremarkable with no leukocytosis or anemia.  BMP is unremarkable with no significant electrolyte derangements, normal kidney function. Ddimer within normal limits when age adjusted. Troponin is negative, EKG without acute ischemic changes. COVID/Flu test is negative. Chest xray is negative without evidence of pneumonia.    The patient was given a DuoNeb here with increased air movement.  I do not think any further emergent labs or imaging are indicated at this time.  She is overall well-appearing here and hemodynamically stable with oxygen remaining above 90% at all times.  Low concern for PE with negative age-adjusted D-dimer by Wells criteria.  Negative high-sensitivity troponin with multiple days of symptoms likely rules out ACS.  She does not have leukocytosis and has had no fevers so my concern for bacterial infection that would require antibiotic treatment is low.  I suspect she is just having a flare/mild exacerbation of COPD, we will plan to treat  with oral prednisone and I prescribed her nebulizer with DuoNeb solution to treat this.  Advised her to follow-up with her primary care provider for recheck next week and return here for any new or worsening symptoms.  Patient agreeable with this treatment plan and verbalized understanding.    Medical Decision Making    History:  Supplemental history from: Documented in chart  External Record(s) reviewed: Outpatient Record: RN triage note    Work Up:  Chart documentation includes differential considered and any EKGs or imaging independently interpreted by provider, where specified.  In additional to work up documented, I considered the following work up: Imaging CT, but deferred due to negative age adjusted ddimer.    External consultation:  Discussion of management with another provider: Documented in chart, if applicable    Complicating factors:  Care impacted by chronic illness: Cancer/Chemotherapy and Chronic Lung Disease  Care affected by social determinants of health: N/A    Disposition considerations: Discharge. I prescribed additional prescription strength medication(s) as charted. I considered admission, but ultimately discharged patient after reassuring labs and imaging.    ED Course   10:38 AM Performed my initial history and physical exam. Discussed workup in the emergency department, management of symptoms, and likely disposition.   1:04 PM I discussed the plan for discharge with the patient or family and they are agreeable.. We discussed supportive cares at home and reasons for return to the ER including new or worsening symptoms - all questions and concerns addressed. Patient to be discharged by RN.    At the conclusion of the encounter I discussed the results of all of the tests and the disposition. The questions were answered. The patient or family acknowledged understanding and was agreeable with the care plan.     Voice recognition software was used in the creation of this note. Any grammatical or  nonsensical errors are due to inherent errors with the software and are not the intention of the writer.     MEDICATIONS GIVEN IN THE EMERGENCY:  Medications   ipratropium - albuterol 0.5 mg/2.5 mg/3 mL (DUONEB) neb solution 3 mL (3 mLs Nebulization $Given 5/20/24 1149)       NEW PRESCRIPTIONS STARTED AT TODAY'S ER VISIT  Discharge Medication List as of 5/20/2024  1:17 PM        START taking these medications    Details   ipratropium - albuterol 0.5 mg/2.5 mg/3 mL (DUONEB) 0.5-2.5 (3) MG/3ML neb solution Take 1 vial (3 mLs) by nebulization every 4 hours as needed for shortness of breath, wheezing or cough, Disp-90 mL, R-0, E-Prescribe      predniSONE (DELTASONE) 20 MG tablet Take two tablets (= 40mg) each day for 5 (five) days, Disp-10 tablet, R-0, E-Prescribe      Respiratory Therapy Supplies (NEBULIZER CUP/TUBING) LIANA Take 1 each by mouth every 4 hours as needed (for wheezing.cough), Disp-1 each, R-0, E-PrescribeInclude tubing and mask for age please.                  =================================================================    HPI    Patient information was obtained from: Patient    Use of : N/A      Yu Rocha is a 63 year old female with PMH of COPD, SCC of right lung s/p lobectomy (2023) who presents to the ED via walk-in for evaluation of wheezing.     The patient reports that 6 days ago, she had onset of increased wheezing.  She notes that she has been coughing and had increased production of a clear sputum.  She initially attributed this to traveling but notes her symptoms have not improved since she returned home.  She had a mildly runny nose at the onset of her wheezing but this has since resolved after taking some allergy medications.  She notes shortness of breath with coughing episodes but otherwise denies shortness of breath.  She has been using an albuterol inhaler without improvement.  She has no history of DVT or PE.  She is a former smoker, quit 2010.    She denies any fevers,  chills, chest pain, hemoptysis, sore throat, leg swelling.      REVIEW OF SYSTEMS   Review of Systems   Constitutional:  Negative for chills and fever.   HENT:  Positive for rhinorrhea (Now resolved). Negative for congestion and sore throat.    Respiratory:  Positive for cough and wheezing. Negative for shortness of breath.         Denies hemoptysis     Cardiovascular:  Negative for chest pain.       All other systems reviewed and are negative unless noted in HPI.      PAST MEDICAL HISTORY:  Past Medical History:   Diagnosis Date    Fibrocystic breast     Hemorrhoids        PAST SURGICAL HISTORY:  Past Surgical History:   Procedure Laterality Date    APPENDECTOMY      BIOPSY BREAST Right     benign    BIOPSY OF BREAST, INCISIONAL      Description: Biopsy Breast Open;  Recorded: 2008;  Comments: benign--fi brocystic changes    CHOLECYSTECTOMY      HC DILATION/CURETTAGE DIAG/THER NON OB      Description: Dilation And Curettage;  Recorded: 2008;  Comments: for miscarriage    HYSTERECTOMY      benign    LAPAROSCOPIC CHOLECYSTECTOMY N/A 2018    Procedure: LAPAROSCOPIC CHOLECYSTECTOMY;  Surgeon: Nicolas Carver MD;  Location: [facility];  Service:     OOPHORECTOMY      ME VAGINAL HYSTERECTOMY,UTERUS 250 GMS/<      Description: Vaginal Hysterectomy;  Recorded: 2008;  Comments: single oophorectomy    [facility] APPENDECTOMY      Description: Appendectomy;  Recorded: 2008;    [facility]  DELIVERY ONLY      Description:  Section;  Recorded: 2008;       CURRENT MEDICATIONS:    ipratropium - albuterol 0.5 mg/2.5 mg/3 mL (DUONEB) 0.5-2.5 (3) MG/3ML neb solution  predniSONE (DELTASONE) 20 MG tablet  Respiratory Therapy Supplies (NEBULIZER CUP/TUBING) LIANA  albuterol (PROAIR HFA/PROVENTIL HFA/VENTOLIN HFA) 108 (90 Base) MCG/ACT inhaler  psyllium (METAMUCIL) 0.52 gram capsule  spacer ([facility]ETTE) holding chamber        ALLERGIES:  Allergies   Allergen Reactions     "Bupropion Hcl [Bupropion] Swelling     throat swelling      Tramadol Other (See Comments)     confusion       FAMILY HISTORY:  Family History   Problem Relation Age of Onset    Substance Abuse Father     Cancer Father         throat    Hypertension Sister     Depression Sister     Cancer Sister     Endometrial Cancer Sister 46.00    Breast Cancer Sister         in her late 40 s    Cancer Brother         throat    Breast Cancer Mother 56.00    Cerebrovascular Disease Mother         ischemic    Diabetes Mother     Cancer Mother     No Known Problems Daughter     No Known Problems Maternal Grandmother     No Known Problems Maternal Grandfather     No Known Problems Paternal Grandmother     No Known Problems Paternal Grandfather     Breast Cancer Maternal Aunt 55.00    No Known Problems Paternal Aunt     Cerebrovascular Disease Sister         hemorrhagic    Breast Cancer Sister         in her 30 s    Hereditary Breast and Ovarian Cancer Syndrome No family hx of     Colon Cancer No family hx of     Ovarian Cancer No family hx of        SOCIAL HISTORY:   Social History     Socioeconomic History    Marital status:    Tobacco Use    Smoking status: Former     Current packs/day: 0.00     Types: Cigarettes     Quit date: 10/16/2010     Years since quittin.6    Smokeless tobacco: Never    Tobacco comments:     smoked for 20 years   Substance and Sexual Activity    Alcohol use: Yes     Comment: Alcoholic Drinks/day: occassinally every 3 months    Drug use: No    Sexual activity: Never       VITALS:  Patient Vitals for the past 24 hrs:   BP Temp Temp src Pulse Resp SpO2 Height Weight   24 1315 136/73 -- -- 95 -- 95 % -- --   24 1145 (!) 164/77 -- -- 65 -- 93 % -- --   24 1012 (!) 141/105 98.9  F (37.2  C) Temporal 84 18 93 % 1.575 m (5' 2\") 72.6 kg (160 lb)       PHYSICAL EXAM    VITAL SIGNS: /73   Pulse 95   Temp 98.9  F (37.2  C) (Temporal)   Resp 18   Ht 1.575 m (5' 2\")   Wt 72.6 kg " (160 lb)   SpO2 95%   BMI 29.26 kg/m    General Appearance: Alert, cooperative, normal speech and facial symmetry, appears stated age, the patient does not appear in distress  Head:  Normocephalic, without obvious abnormality, atraumatic  Eyes: Conjunctiva/corneas clear, EOM's intact, no nystagmus, PERRL  ENT:  Lips, mucosa, and tongue normal; teeth and gums normal, no pharyngeal inflammation, no dysphonia or difficulty swallowing, membranes are moist without pallor  Cardio:  Regular rate and rhythm, S1 and S2 normal, no murmur, rub    or gallop, 2+ pulses symmetric in all extremities  Pulm: Diminished breath sounds throughout; respirations unlabored with no accessory muscle use  Abdomen:  Abdomen is soft, non-distended with no tenderness to palpation, rebound tenderness, or guarding.   Extremities:  Extremities normal, there is no tenderness to palpation, atraumatic, no cyanosis or edema, full function and range of motion, pulses equal in all extremities, normal cap refill, no joint swelling  Neuro: Patient is awake, alert, and responsive to voice. No gross motor weaknesses or sensory loss; moves all extremities.    LAB:  All pertinent labs reviewed and interpreted.  Labs Ordered and Resulted from Time of ED Arrival to Time of ED Departure   D DIMER QUANTITATIVE - Abnormal       Result Value    D-Dimer Quantitative 0.54 (*)    BASIC METABOLIC PANEL - Abnormal    Sodium 139      Potassium 4.4      Chloride 105      Carbon Dioxide (CO2) 24      Anion Gap 10      Urea Nitrogen 9.0      Creatinine 0.61      GFR Estimate >90      Calcium 9.2      Glucose 106 (*)    INFLUENZA A/B, RSV, & SARS-COV2 PCR - Normal    Influenza A PCR Negative      Influenza B PCR Negative      RSV PCR Negative      SARS CoV2 PCR Negative     TROPONIN T, HIGH SENSITIVITY - Normal    Troponin T, High Sensitivity <6     CBC WITH PLATELETS AND DIFFERENTIAL    WBC Count 6.4      RBC Count 4.74      Hemoglobin 14.0      Hematocrit 42.2      MCV  89      MCH 29.5      MCHC 33.2      RDW 12.0      Platelet Count 261      % Neutrophils 62      % Lymphocytes 24      % Monocytes 7      % Eosinophils 6      % Basophils 1      % Immature Granulocytes 0      NRBCs per 100 WBC 0      Absolute Neutrophils 4.0      Absolute Lymphocytes 1.6      Absolute Monocytes 0.5      Absolute Eosinophils 0.4      Absolute Basophils 0.0      Absolute Immature Granulocytes 0.0      Absolute NRBCs 0.0         RADIOLOGY:  Reviewed all pertinent imaging. Please see official radiology report.  Chest XR,  PA & LAT   Final Result   IMPRESSION: Negative chest. Lungs clear.          EKG:    Performed at: 11:11 AM    I have independently reviewed and interpreted the EKG, along with the final read. EKG also reviewed by Dr. Samuel Meraz.    Ventricular rate 65 bpm  WI interval 150 ms  QRS duration 74 ms  QT/QTc 386/401 ms  P-R-T axes 70 51 61    Impression: NSR      Cindy Bellamy PA-C  Emergency Medicine  [facility] EMERGENCY DEPARTMENT  [facility] [facility] [id number] [phone]  Dept: [phone]       Cindy Bellamy PA-C  05/20/24 1429

## 2024-05-20 NOTE — DISCHARGE INSTRUCTIONS
YOU WERE SEEN HERE TODAY FOR EVALUATION OF WHEEZING.  YOUR LAB WORK IS REASSURING WITH NO EVIDENCE OF INFECTION IN THE BLOOD, HEART ATTACK, OR BLOOD CLOTS.  YOUR CHEST X-RAY IS NORMAL WITH NO EVIDENCE OF PNEUMONIA.     As we discussed, your symptoms are most consistent with a COPD exacerbation.  I will prescribe you oral steroids to take for 5 days which should help treat the symptoms.  Use the nebulizer every 4-6 hours as needed until you are feeling better and then you can go back to using your inhaler as needed.    Follow-up with your primary care provider for recheck.  Return here for any new or worsening symptoms including severe pain, difficulty breathing, chest pain, fevers, coughing up blood, or any other symptoms that concern you.

## 2024-05-20 NOTE — ED TRIAGE NOTES
Patient arrives for evaluation of persistent wheezing since 5/12. Patient traveled to [place] and thought that maybe her wheezing was related to change in environment and allergies, since being back in [place] patient has not noticed any improvement. Patient does have a history of lung cancer and COPD. Patient has tried taking albuterol inhaler but has had little to no relief.

## 2024-05-25 LAB
ATRIAL RATE - MUSE: 65 BPM
DIASTOLIC BLOOD PRESSURE - MUSE: NORMAL MMHG
INTERPRETATION ECG - MUSE: NORMAL
P AXIS - MUSE: 70 DEGREES
PR INTERVAL - MUSE: 150 MS
QRS DURATION - MUSE: 74 MS
QT - MUSE: 386 MS
QTC - MUSE: 401 MS
R AXIS - MUSE: 51 DEGREES
SYSTOLIC BLOOD PRESSURE - MUSE: NORMAL MMHG
T AXIS - MUSE: 61 DEGREES
VENTRICULAR RATE- MUSE: 65 BPM